# Patient Record
Sex: MALE | Race: WHITE | NOT HISPANIC OR LATINO | Employment: OTHER | URBAN - METROPOLITAN AREA
[De-identification: names, ages, dates, MRNs, and addresses within clinical notes are randomized per-mention and may not be internally consistent; named-entity substitution may affect disease eponyms.]

---

## 2024-05-27 ENCOUNTER — HOSPITAL ENCOUNTER (EMERGENCY)
Facility: CLINIC | Age: 64
Discharge: HOME | End: 2024-05-27
Attending: PHYSICIAN ASSISTANT | Admitting: PHYSICIAN ASSISTANT
Payer: COMMERCIAL

## 2024-05-27 VITALS
SYSTOLIC BLOOD PRESSURE: 118 MMHG | TEMPERATURE: 97.2 F | HEART RATE: 94 BPM | RESPIRATION RATE: 20 BRPM | DIASTOLIC BLOOD PRESSURE: 73 MMHG | OXYGEN SATURATION: 98 %

## 2024-05-27 DIAGNOSIS — M25.561 RIGHT KNEE PAIN: ICD-10-CM

## 2024-05-27 PROCEDURE — 99203 OFFICE O/P NEW LOW 30 MIN: CPT | Performed by: PHYSICIAN ASSISTANT

## 2024-05-27 PROCEDURE — G0463 HOSPITAL OUTPT CLINIC VISIT: HCPCS

## 2024-05-27 RX ORDER — LOSARTAN POTASSIUM AND HYDROCHLOROTHIAZIDE 12.5; 5 MG/1; MG/1
1 TABLET ORAL DAILY
COMMUNITY
Start: 2024-01-29

## 2024-05-27 RX ORDER — ATORVASTATIN CALCIUM 40 MG/1
40 TABLET, FILM COATED ORAL AT BEDTIME
Status: ON HOLD | COMMUNITY
Start: 2023-08-07 | End: 2024-07-30

## 2024-05-27 RX ORDER — AMLODIPINE BESYLATE 10 MG/1
1 TABLET ORAL EVERY EVENING
COMMUNITY
Start: 2024-01-29

## 2024-05-27 RX ORDER — SPIRONOLACTONE 25 MG/1
0.5 TABLET ORAL EVERY EVENING
COMMUNITY
Start: 2024-01-29

## 2024-05-27 ASSESSMENT — ENCOUNTER SYMPTOMS: CONSTITUTIONAL NEGATIVE: 1

## 2024-05-27 ASSESSMENT — COLUMBIA-SUICIDE SEVERITY RATING SCALE - C-SSRS
2. HAVE YOU ACTUALLY HAD ANY THOUGHTS OF KILLING YOURSELF IN THE PAST MONTH?: NO
6. HAVE YOU EVER DONE ANYTHING, STARTED TO DO ANYTHING, OR PREPARED TO DO ANYTHING TO END YOUR LIFE?: NO
1. IN THE PAST MONTH, HAVE YOU WISHED YOU WERE DEAD OR WISHED YOU COULD GO TO SLEEP AND NOT WAKE UP?: NO

## 2024-05-27 ASSESSMENT — ACTIVITIES OF DAILY LIVING (ADL): ADLS_ACUITY_SCORE: 35

## 2024-05-27 NOTE — ED TRIAGE NOTES
Pt present in wheelchair , states he does not normally use one at home.   [place] pain to the back of right knee.   Pt denies falling   Pt was given KENALOG injection at 5/10/24 visit   Pt had the right knee assessed and Xray 5/10/24 and states it feels worse since this morning.     Took ibuprofen at 1300 per pt

## 2024-05-27 NOTE — ED PROVIDER NOTES
History     Chief Complaint   Patient presents with    Knee Injury     HPI  Misha Castro is a 63 year old male who presents to Urgent Care with complaints of right knee pain.  [place] he started to experience pain in his right knee.  The pain became suddenly worse despite taking small steps.  He has increased pain with attempting to weight-bear now.  Pain is localized to posterior right knee.  He states he has had intermittent pain in this knee and had x-rays obtained on 5/10/2024 of the knee that were reportedly negative.  He received a steroid injection in the knee which helped for about 2 weeks before his pain returned.  Denies associated swelling.  Denies fevers or chills.  Did not fall.      Allergies:  No Known Allergies    Problem List:    Patient Active Problem List    Diagnosis Date Noted    CARDIOVASCULAR SCREENING; LDL GOAL LESS THAN 130 10/31/2010     Priority: Medium    HTN (hypertension) 12/15/2008     Priority: Medium     (Problem list name updated by automated process. Provider to review and confirm.)          Past Medical History:    No past medical history on file.    Past Surgical History:    Past Surgical History:   Procedure Laterality Date    COLONOSCOPY  12/9/2003       Family History:    Family History   Problem Relation Age of Onset    Breast Cancer Maternal Grandmother     Heart Disease Maternal Grandfather        Social History:  Marital Status:   [2]  Social History     Tobacco Use    Smoking status: Never   Substance Use Topics    Alcohol use: Yes     Comment: occ    Drug use: No        Medications:    amLODIPine (NORVASC) 10 MG tablet  atorvastatin (LIPITOR) 40 MG tablet  losartan-hydrochlorothiazide (HYZAAR) 50-12.5 MG tablet  spironolactone (ALDACTONE) 25 MG tablet  CLONIDINE HCL 0.1 MG OR TABS  FIBER THERAPY 0.52 GM OR CAPS  LISINOPRIL 20 MG OR TABS  METOPROLOL TARTRATE 50 MG OR TABS  ZANTAC 150 MG OR  TABS          Review of Systems   Constitutional: Negative.    Musculoskeletal:         Right knee pain   Skin: Negative.    All other systems reviewed and are negative.      Physical Exam   BP: 118/73  Pulse: 94  Temp: 97.2  F (36.2  C)  Resp: 20  SpO2: 98 %      Physical Exam  Constitutional:       General: He is not in acute distress.     Appearance: Normal appearance. He is well-developed. He is not ill-appearing, toxic-appearing or diaphoretic.   HENT:      Head: Normocephalic and atraumatic.   Eyes:      Conjunctiva/sclera: Conjunctivae normal.   Cardiovascular:      Pulses: Normal pulses.   Pulmonary:      Effort: Pulmonary effort is normal.   Musculoskeletal:      Cervical back: Neck supple.      Right knee: Swelling present. No effusion, erythema, ecchymosis or crepitus. Decreased range of motion. Tenderness (posteriorly) present. Normal alignment.      Right lower leg: Normal. No swelling or tenderness. No edema.      Right ankle: Normal. No swelling. No tenderness. Normal range of motion.      Comments: Increased pain with full extension and full flexion   Skin:     General: Skin is warm and dry.      Capillary Refill: Capillary refill takes less than 2 seconds.   Neurological:      General: No focal deficit present.      Mental Status: He is alert.      Sensory: Sensation is intact.      Motor: Motor function is intact.         ED Course        Procedures    No results found for this or any previous visit (from the past 24 hour(s)).    Medications - No data to display    Assessments & Plan (with Medical Decision Making)     Pt is a 63 year old male who presents to Urgent Care with complaints of right knee pain.  Patient [place] he started to experience pain in his right knee.  The pain became suddenly worse despite taking small steps.  He has increased pain with attempting to weight-bear now.  Pain is localized to posterior right knee.  He states he has  had intermittent pain in this knee and had x-rays obtained on 5/10/2024 of the knee that were reportedly negative.  He received a steroid injection in the knee which helped for about 2 weeks before his pain returned.  Denies associated swelling.  Denies fevers or chills.  Did not fall.    Pt is afebrile on arrival.  Exam as above.  Will hold off on x-ray imaging at this time as patient just had x-rays a couple weeks ago that he reports were negative.  No indication for emergent MRI imaging today.  Does not appear consistent with septic joint or infection.  Encouraged symptomatic treatments at home.  Orthopedic referral placed.  Return precautions were reviewed.  Hand-outs were provided.    Patient was instructed to follow-up with orthopedics for continued care and management.  He is to return to the ED for persistent and/or worsening symptoms.  Patient expressed understanding of the diagnosis and plan and was discharged home in good condition.    I have reviewed the nursing notes.    I have reviewed the findings, diagnosis, plan and need for follow up with the patient.      Discharge Medication List as of 5/27/2024  3:17 PM          Final diagnoses:   Right knee pain       5/27/2024   [facility] EMERGENCY DEPT      Disclaimer:  This note consists of symbols derived from keyboarding, dictation and/or voice recognition software.  As a result, there may be errors in the script that have gone undetected.  Please consider this when interpreting information found in this chart.     Kerry Macias PA-C  05/27/24 1935

## 2024-07-17 ENCOUNTER — TRANSFERRED RECORDS (OUTPATIENT)
Dept: HEALTH INFORMATION MANAGEMENT | Facility: CLINIC | Age: 64
End: 2024-07-17
Payer: COMMERCIAL

## 2024-07-18 ENCOUNTER — HOSPITAL ENCOUNTER (OUTPATIENT)
Facility: CLINIC | Age: 64
End: 2024-07-18
Attending: ORTHOPAEDIC SURGERY | Admitting: ORTHOPAEDIC SURGERY
Payer: COMMERCIAL

## 2024-07-18 DIAGNOSIS — S83.249A MEDIAL MENISCUS TEAR: ICD-10-CM

## 2024-07-29 ENCOUNTER — APPOINTMENT (OUTPATIENT)
Dept: CT IMAGING | Facility: CLINIC | Age: 64
End: 2024-07-29
Attending: EMERGENCY MEDICINE
Payer: COMMERCIAL

## 2024-07-29 ENCOUNTER — HOSPITAL ENCOUNTER (OUTPATIENT)
Facility: CLINIC | Age: 64
Setting detail: OBSERVATION
Discharge: HOME | End: 2024-07-30
Attending: EMERGENCY MEDICINE | Admitting: INTERNAL MEDICINE
Payer: COMMERCIAL

## 2024-07-29 DIAGNOSIS — G45.3 AMAUROSIS FUGAX OF RIGHT EYE: ICD-10-CM

## 2024-07-29 DIAGNOSIS — H34.231 RETINAL ARTERY BRANCH OCCLUSION OF RIGHT EYE: Primary | ICD-10-CM

## 2024-07-29 PROBLEM — H81.10 BPPV (BENIGN PAROXYSMAL POSITIONAL VERTIGO): Status: ACTIVE | Noted: 2024-07-29

## 2024-07-29 PROBLEM — K58.9 IRRITABLE BOWEL SYNDROME: Status: ACTIVE | Noted: 2024-07-29

## 2024-07-29 PROBLEM — E78.2 MIXED HYPERLIPIDEMIA: Status: ACTIVE | Noted: 2024-07-29

## 2024-07-29 PROBLEM — N43.3 HYDROCELE: Status: ACTIVE | Noted: 2024-07-29

## 2024-07-29 LAB
ANION GAP SERPL CALCULATED.3IONS-SCNC: 12 MMOL/L (ref 7–15)
APTT PPP: 26 SECONDS (ref 22–38)
BASOPHILS # BLD AUTO: 0.1 10E3/UL (ref 0–0.2)
BASOPHILS NFR BLD AUTO: 1 %
BUN SERPL-MCNC: 22 MG/DL (ref 8–23)
CALCIUM SERPL-MCNC: 8.8 MG/DL (ref 8.8–10.4)
CHLORIDE SERPL-SCNC: 101 MMOL/L (ref 98–107)
CREAT SERPL-MCNC: 1.05 MG/DL (ref 0.67–1.17)
EGFRCR SERPLBLD CKD-EPI 2021: 80 ML/MIN/1.73M2
EOSINOPHIL # BLD AUTO: 0.2 10E3/UL (ref 0–0.7)
EOSINOPHIL NFR BLD AUTO: 2 %
ERYTHROCYTE [DISTWIDTH] IN BLOOD BY AUTOMATED COUNT: 13.2 % (ref 10–15)
GLUCOSE SERPL-MCNC: 113 MG/DL (ref 70–99)
HCO3 SERPL-SCNC: 25 MMOL/L (ref 22–29)
HCT VFR BLD AUTO: 43.9 % (ref 40–53)
HGB BLD-MCNC: 15.7 G/DL (ref 13.3–17.7)
HOLD SPECIMEN: NORMAL
IMM GRANULOCYTES # BLD: 0.1 10E3/UL
IMM GRANULOCYTES NFR BLD: 1 %
INR PPP: 1 (ref 0.85–1.15)
LYMPHOCYTES # BLD AUTO: 2.3 10E3/UL (ref 0.8–5.3)
LYMPHOCYTES NFR BLD AUTO: 23 %
MCH RBC QN AUTO: 30.4 PG (ref 26.5–33)
MCHC RBC AUTO-ENTMCNC: 35.8 G/DL (ref 31.5–36.5)
MCV RBC AUTO: 85 FL (ref 78–100)
MONOCYTES # BLD AUTO: 0.9 10E3/UL (ref 0–1.3)
MONOCYTES NFR BLD AUTO: 9 %
NEUTROPHILS # BLD AUTO: 6.5 10E3/UL (ref 1.6–8.3)
NEUTROPHILS NFR BLD AUTO: 64 %
NRBC # BLD AUTO: 0 10E3/UL
NRBC BLD AUTO-RTO: 0 /100
PLATELET # BLD AUTO: 229 10E3/UL (ref 150–450)
POTASSIUM SERPL-SCNC: 3.6 MMOL/L (ref 3.4–5.3)
RBC # BLD AUTO: 5.16 10E6/UL (ref 4.4–5.9)
SODIUM SERPL-SCNC: 138 MMOL/L (ref 135–145)
TROPONIN T SERPL HS-MCNC: <6 NG/L
WBC # BLD AUTO: 10.1 10E3/UL (ref 4–11)

## 2024-07-29 PROCEDURE — 85730 THROMBOPLASTIN TIME PARTIAL: CPT | Performed by: EMERGENCY MEDICINE

## 2024-07-29 PROCEDURE — 70496 CT ANGIOGRAPHY HEAD: CPT

## 2024-07-29 PROCEDURE — 70450 CT HEAD/BRAIN W/O DYE: CPT | Mod: XU

## 2024-07-29 PROCEDURE — G0378 HOSPITAL OBSERVATION PER HR: HCPCS

## 2024-07-29 PROCEDURE — 99285 EMERGENCY DEPT VISIT HI MDM: CPT | Mod: 25 | Performed by: EMERGENCY MEDICINE

## 2024-07-29 PROCEDURE — 99285 EMERGENCY DEPT VISIT HI MDM: CPT | Mod: 25

## 2024-07-29 PROCEDURE — 80048 BASIC METABOLIC PNL TOTAL CA: CPT | Performed by: EMERGENCY MEDICINE

## 2024-07-29 PROCEDURE — 250N000009 HC RX 250: Performed by: EMERGENCY MEDICINE

## 2024-07-29 PROCEDURE — 85025 COMPLETE CBC W/AUTO DIFF WBC: CPT | Performed by: EMERGENCY MEDICINE

## 2024-07-29 PROCEDURE — 80061 LIPID PANEL: CPT

## 2024-07-29 PROCEDURE — 85610 PROTHROMBIN TIME: CPT | Performed by: EMERGENCY MEDICINE

## 2024-07-29 PROCEDURE — 99222 1ST HOSP IP/OBS MODERATE 55: CPT

## 2024-07-29 PROCEDURE — 250N000011 HC RX IP 250 OP 636: Performed by: EMERGENCY MEDICINE

## 2024-07-29 PROCEDURE — 93010 ELECTROCARDIOGRAM REPORT: CPT | Performed by: EMERGENCY MEDICINE

## 2024-07-29 PROCEDURE — 36415 COLL VENOUS BLD VENIPUNCTURE: CPT | Performed by: EMERGENCY MEDICINE

## 2024-07-29 PROCEDURE — 84484 ASSAY OF TROPONIN QUANT: CPT | Performed by: EMERGENCY MEDICINE

## 2024-07-29 PROCEDURE — 83036 HEMOGLOBIN GLYCOSYLATED A1C: CPT

## 2024-07-29 PROCEDURE — 250N000013 HC RX MED GY IP 250 OP 250 PS 637: Performed by: EMERGENCY MEDICINE

## 2024-07-29 PROCEDURE — 93005 ELECTROCARDIOGRAM TRACING: CPT

## 2024-07-29 RX ORDER — CLOPIDOGREL BISULFATE 75 MG/1
300 TABLET ORAL ONCE
Status: COMPLETED | OUTPATIENT
Start: 2024-07-29 | End: 2024-07-29

## 2024-07-29 RX ORDER — ASPIRIN 81 MG/1
81 TABLET, CHEWABLE ORAL EVERY EVENING
Status: ON HOLD | COMMUNITY
End: 2024-07-30

## 2024-07-29 RX ORDER — CALCIUM POLYCARBOPHIL 625 MG
1 TABLET ORAL EVERY EVENING
COMMUNITY

## 2024-07-29 RX ORDER — IOPAMIDOL 755 MG/ML
67 INJECTION, SOLUTION INTRAVASCULAR ONCE
Status: COMPLETED | OUTPATIENT
Start: 2024-07-29 | End: 2024-07-29

## 2024-07-29 RX ORDER — FAMOTIDINE 10 MG
10 TABLET ORAL DAILY PRN
COMMUNITY

## 2024-07-29 RX ORDER — CHOLECALCIFEROL (VITAMIN D3) 50 MCG
1 TABLET ORAL EVERY EVENING
COMMUNITY

## 2024-07-29 RX ORDER — ASPIRIN 325 MG
325 TABLET ORAL ONCE
Status: COMPLETED | OUTPATIENT
Start: 2024-07-29 | End: 2024-07-29

## 2024-07-29 RX ADMIN — IOPAMIDOL 67 ML: 755 INJECTION, SOLUTION INTRAVENOUS at 20:36

## 2024-07-29 RX ADMIN — CLOPIDOGREL BISULFATE 300 MG: 75 TABLET ORAL at 21:34

## 2024-07-29 RX ADMIN — SODIUM CHLORIDE 100 ML: 9 INJECTION, SOLUTION INTRAVENOUS at 20:36

## 2024-07-29 RX ADMIN — ASPIRIN 325 MG ORAL TABLET 325 MG: 325 PILL ORAL at 21:34

## 2024-07-29 ASSESSMENT — ACTIVITIES OF DAILY LIVING (ADL)
CONCENTRATING,_REMEMBERING_OR_MAKING_DECISIONS_DIFFICULTY: NO
ADLS_ACUITY_SCORE: 35
DIFFICULTY_EATING/SWALLOWING: NO
WALKING_OR_CLIMBING_STAIRS_DIFFICULTY: NO
HEARING_DIFFICULTY_OR_DEAF: NO
ADLS_ACUITY_SCORE: 20
DRESSING/BATHING_DIFFICULTY: NO
WEAR_GLASSES_OR_BLIND: YES
ADLS_ACUITY_SCORE: 35
FALL_HISTORY_WITHIN_LAST_SIX_MONTHS: NO
DIFFICULTY_COMMUNICATING: NO
CHANGE_IN_FUNCTIONAL_STATUS_SINCE_ONSET_OF_CURRENT_ILLNESS/INJURY: NO
ADLS_ACUITY_SCORE: 35
DOING_ERRANDS_INDEPENDENTLY_DIFFICULTY: NO
TOILETING_ISSUES: NO

## 2024-07-29 ASSESSMENT — COLUMBIA-SUICIDE SEVERITY RATING SCALE - C-SSRS
2. HAVE YOU ACTUALLY HAD ANY THOUGHTS OF KILLING YOURSELF IN THE PAST MONTH?: NO
1. IN THE PAST MONTH, HAVE YOU WISHED YOU WERE DEAD OR WISHED YOU COULD GO TO SLEEP AND NOT WAKE UP?: NO
6. HAVE YOU EVER DONE ANYTHING, STARTED TO DO ANYTHING, OR PREPARED TO DO ANYTHING TO END YOUR LIFE?: NO

## 2024-07-29 NOTE — ED TRIAGE NOTES
Pt had an episode around 4pm today where he lost peripheral vision to the right. It lasted for about 45 min. [facility]. Was cleared from there but sent in to get evaluated. No headache. Symptoms have completely resolved     Triage Assessment (Adult)       Row Name 07/29/24 0385          Triage Assessment    Airway WDL WDL        Respiratory WDL    Respiratory WDL WDL        Skin Circulation/Temperature WDL    Skin Circulation/Temperature WDL WDL        Cardiac WDL    Cardiac WDL WDL        Peripheral/Neurovascular WDL    Peripheral Neurovascular WDL WDL        Cognitive/Neuro/Behavioral WDL    Cognitive/Neuro/Behavioral WDL WDL

## 2024-07-30 ENCOUNTER — ORDERS ONLY (AUTO-RELEASED) (OUTPATIENT)
Dept: MEDSURG UNIT | Facility: CLINIC | Age: 64
End: 2024-07-30
Payer: COMMERCIAL

## 2024-07-30 ENCOUNTER — APPOINTMENT (OUTPATIENT)
Dept: MRI IMAGING | Facility: CLINIC | Age: 64
End: 2024-07-30
Attending: NURSE PRACTITIONER
Payer: COMMERCIAL

## 2024-07-30 ENCOUNTER — APPOINTMENT (OUTPATIENT)
Dept: SPEECH THERAPY | Facility: CLINIC | Age: 64
End: 2024-07-30
Payer: COMMERCIAL

## 2024-07-30 ENCOUNTER — APPOINTMENT (OUTPATIENT)
Dept: OCCUPATIONAL THERAPY | Facility: CLINIC | Age: 64
End: 2024-07-30
Payer: COMMERCIAL

## 2024-07-30 ENCOUNTER — APPOINTMENT (OUTPATIENT)
Dept: CARDIOLOGY | Facility: CLINIC | Age: 64
End: 2024-07-30
Payer: COMMERCIAL

## 2024-07-30 VITALS
TEMPERATURE: 97.9 F | SYSTOLIC BLOOD PRESSURE: 158 MMHG | DIASTOLIC BLOOD PRESSURE: 95 MMHG | OXYGEN SATURATION: 95 % | HEIGHT: 67 IN | RESPIRATION RATE: 18 BRPM | HEART RATE: 97 BPM

## 2024-07-30 DIAGNOSIS — G45.3 AMAUROSIS FUGAX OF RIGHT EYE: ICD-10-CM

## 2024-07-30 LAB
ANION GAP SERPL CALCULATED.3IONS-SCNC: 13 MMOL/L (ref 7–15)
BUN SERPL-MCNC: 18.4 MG/DL (ref 8–23)
CALCIUM SERPL-MCNC: 8.5 MG/DL (ref 8.8–10.4)
CHLORIDE SERPL-SCNC: 102 MMOL/L (ref 98–107)
CHOLEST SERPL-MCNC: 144 MG/DL
CREAT SERPL-MCNC: 1.04 MG/DL (ref 0.67–1.17)
EGFRCR SERPLBLD CKD-EPI 2021: 81 ML/MIN/1.73M2
ERYTHROCYTE [DISTWIDTH] IN BLOOD BY AUTOMATED COUNT: 13.2 % (ref 10–15)
GLUCOSE BLDC GLUCOMTR-MCNC: 107 MG/DL (ref 70–99)
GLUCOSE BLDC GLUCOMTR-MCNC: 117 MG/DL (ref 70–99)
GLUCOSE BLDC GLUCOMTR-MCNC: 157 MG/DL (ref 70–99)
GLUCOSE SERPL-MCNC: 97 MG/DL (ref 70–99)
HBA1C MFR BLD: 5.7 %
HCO3 SERPL-SCNC: 25 MMOL/L (ref 22–29)
HCT VFR BLD AUTO: 43.7 % (ref 40–53)
HDLC SERPL-MCNC: 46 MG/DL
HGB BLD-MCNC: 15.2 G/DL (ref 13.3–17.7)
LDLC SERPL CALC-MCNC: 72 MG/DL
LVEF ECHO: NORMAL
MCH RBC QN AUTO: 29.9 PG (ref 26.5–33)
MCHC RBC AUTO-ENTMCNC: 34.8 G/DL (ref 31.5–36.5)
MCV RBC AUTO: 86 FL (ref 78–100)
NONHDLC SERPL-MCNC: 98 MG/DL
PLATELET # BLD AUTO: 218 10E3/UL (ref 150–450)
POTASSIUM SERPL-SCNC: 3.6 MMOL/L (ref 3.4–5.3)
RBC # BLD AUTO: 5.08 10E6/UL (ref 4.4–5.9)
SODIUM SERPL-SCNC: 140 MMOL/L (ref 135–145)
TRIGL SERPL-MCNC: 129 MG/DL
WBC # BLD AUTO: 9.3 10E3/UL (ref 4–11)

## 2024-07-30 PROCEDURE — 999N000147 HC STATISTIC PT IP EVAL DEFER

## 2024-07-30 PROCEDURE — 99239 HOSP IP/OBS DSCHRG MGMT >30: CPT | Performed by: STUDENT IN AN ORGANIZED HEALTH CARE EDUCATION/TRAINING PROGRAM

## 2024-07-30 PROCEDURE — 93306 TTE W/DOPPLER COMPLETE: CPT

## 2024-07-30 PROCEDURE — G0427 INPT/ED TELECONSULT70: HCPCS | Mod: G0 | Performed by: NURSE PRACTITIONER

## 2024-07-30 PROCEDURE — 70553 MRI BRAIN STEM W/O & W/DYE: CPT

## 2024-07-30 PROCEDURE — 250N000013 HC RX MED GY IP 250 OP 250 PS 637

## 2024-07-30 PROCEDURE — G0378 HOSPITAL OBSERVATION PER HR: HCPCS

## 2024-07-30 PROCEDURE — 255N000002 HC RX 255 OP 636: Performed by: STUDENT IN AN ORGANIZED HEALTH CARE EDUCATION/TRAINING PROGRAM

## 2024-07-30 PROCEDURE — 80048 BASIC METABOLIC PNL TOTAL CA: CPT

## 2024-07-30 PROCEDURE — 85027 COMPLETE CBC AUTOMATED: CPT

## 2024-07-30 PROCEDURE — 36415 COLL VENOUS BLD VENIPUNCTURE: CPT

## 2024-07-30 PROCEDURE — 82962 GLUCOSE BLOOD TEST: CPT

## 2024-07-30 PROCEDURE — 93306 TTE W/DOPPLER COMPLETE: CPT | Mod: 26 | Performed by: INTERNAL MEDICINE

## 2024-07-30 PROCEDURE — A9585 GADOBUTROL INJECTION: HCPCS | Performed by: STUDENT IN AN ORGANIZED HEALTH CARE EDUCATION/TRAINING PROGRAM

## 2024-07-30 PROCEDURE — 999N000226 HC STATISTIC SLP IP EVAL DEFER: Performed by: SPEECH-LANGUAGE PATHOLOGIST

## 2024-07-30 PROCEDURE — 97165 OT EVAL LOW COMPLEX 30 MIN: CPT | Mod: GO

## 2024-07-30 RX ORDER — ASPIRIN 325 MG
325 TABLET, DELAYED RELEASE (ENTERIC COATED) ORAL DAILY
Qty: 90 TABLET | Refills: 0 | Status: SHIPPED | OUTPATIENT
Start: 2024-08-21

## 2024-07-30 RX ORDER — ACETAMINOPHEN 650 MG/1
650 SUPPOSITORY RECTAL EVERY 4 HOURS PRN
Status: DISCONTINUED | OUTPATIENT
Start: 2024-07-30 | End: 2024-07-30 | Stop reason: HOSPADM

## 2024-07-30 RX ORDER — ASPIRIN 81 MG/1
81 TABLET, CHEWABLE ORAL EVERY EVENING
Qty: 20 TABLET | Refills: 0 | Status: SHIPPED | OUTPATIENT
Start: 2024-07-30 | End: 2024-08-19

## 2024-07-30 RX ORDER — HYDROCHLOROTHIAZIDE 12.5 MG/1
12.5 TABLET ORAL DAILY
Status: DISCONTINUED | OUTPATIENT
Start: 2024-07-30 | End: 2024-07-30 | Stop reason: HOSPADM

## 2024-07-30 RX ORDER — ATORVASTATIN CALCIUM 20 MG/1
40 TABLET, FILM COATED ORAL AT BEDTIME
Status: DISCONTINUED | OUTPATIENT
Start: 2024-07-30 | End: 2024-07-30

## 2024-07-30 RX ORDER — ASPIRIN 81 MG/1
81 TABLET, CHEWABLE ORAL EVERY EVENING
Status: DISCONTINUED | OUTPATIENT
Start: 2024-07-30 | End: 2024-07-30 | Stop reason: HOSPADM

## 2024-07-30 RX ORDER — CLOPIDOGREL BISULFATE 75 MG/1
75 TABLET ORAL DAILY
Status: DISCONTINUED | OUTPATIENT
Start: 2024-07-30 | End: 2024-07-30 | Stop reason: HOSPADM

## 2024-07-30 RX ORDER — LOSARTAN POTASSIUM AND HYDROCHLOROTHIAZIDE 12.5; 5 MG/1; MG/1
1 TABLET ORAL DAILY
Status: DISCONTINUED | OUTPATIENT
Start: 2024-07-30 | End: 2024-07-30

## 2024-07-30 RX ORDER — ACETAMINOPHEN 650 MG/20.3ML
650 LIQUID ORAL EVERY 4 HOURS PRN
Status: DISCONTINUED | OUTPATIENT
Start: 2024-07-30 | End: 2024-07-30 | Stop reason: HOSPADM

## 2024-07-30 RX ORDER — GADOBUTROL 604.72 MG/ML
9 INJECTION INTRAVENOUS ONCE
Status: COMPLETED | OUTPATIENT
Start: 2024-07-30 | End: 2024-07-30

## 2024-07-30 RX ORDER — LIDOCAINE 40 MG/G
CREAM TOPICAL
Status: DISCONTINUED | OUTPATIENT
Start: 2024-07-30 | End: 2024-07-30 | Stop reason: HOSPADM

## 2024-07-30 RX ORDER — ACETAMINOPHEN 325 MG/1
650 TABLET ORAL EVERY 4 HOURS PRN
Status: DISCONTINUED | OUTPATIENT
Start: 2024-07-30 | End: 2024-07-30 | Stop reason: HOSPADM

## 2024-07-30 RX ORDER — LOSARTAN POTASSIUM 50 MG/1
50 TABLET ORAL DAILY
Status: DISCONTINUED | OUTPATIENT
Start: 2024-07-30 | End: 2024-07-30 | Stop reason: HOSPADM

## 2024-07-30 RX ORDER — AMLODIPINE BESYLATE 10 MG/1
10 TABLET ORAL EVERY EVENING
Status: DISCONTINUED | OUTPATIENT
Start: 2024-07-30 | End: 2024-07-30 | Stop reason: HOSPADM

## 2024-07-30 RX ORDER — ONDANSETRON 4 MG/1
4 TABLET, ORALLY DISINTEGRATING ORAL EVERY 6 HOURS PRN
Status: DISCONTINUED | OUTPATIENT
Start: 2024-07-30 | End: 2024-07-30 | Stop reason: HOSPADM

## 2024-07-30 RX ORDER — ATORVASTATIN CALCIUM 80 MG/1
80 TABLET, FILM COATED ORAL AT BEDTIME
Qty: 30 TABLET | Refills: 0 | Status: SHIPPED | OUTPATIENT
Start: 2024-07-30

## 2024-07-30 RX ORDER — ATORVASTATIN CALCIUM 40 MG/1
80 TABLET, FILM COATED ORAL AT BEDTIME
Qty: 30 TABLET | Refills: 0 | Status: SHIPPED | OUTPATIENT
Start: 2024-07-30 | End: 2024-07-30

## 2024-07-30 RX ORDER — FAMOTIDINE 10 MG
10 TABLET ORAL DAILY PRN
Status: DISCONTINUED | OUTPATIENT
Start: 2024-07-30 | End: 2024-07-30 | Stop reason: HOSPADM

## 2024-07-30 RX ORDER — CLOPIDOGREL BISULFATE 75 MG/1
75 TABLET ORAL DAILY
Qty: 20 TABLET | Refills: 0 | Status: SHIPPED | OUTPATIENT
Start: 2024-07-31 | End: 2024-08-20

## 2024-07-30 RX ORDER — ATORVASTATIN CALCIUM 80 MG/1
80 TABLET, FILM COATED ORAL AT BEDTIME
Status: DISCONTINUED | OUTPATIENT
Start: 2024-07-30 | End: 2024-07-30 | Stop reason: HOSPADM

## 2024-07-30 RX ORDER — SPIRONOLACTONE 25 MG
12.5 TABLET ORAL EVERY EVENING
Status: DISCONTINUED | OUTPATIENT
Start: 2024-07-30 | End: 2024-07-30 | Stop reason: HOSPADM

## 2024-07-30 RX ORDER — ONDANSETRON 2 MG/ML
4 INJECTION INTRAMUSCULAR; INTRAVENOUS EVERY 6 HOURS PRN
Status: DISCONTINUED | OUTPATIENT
Start: 2024-07-30 | End: 2024-07-30 | Stop reason: HOSPADM

## 2024-07-30 RX ADMIN — LOSARTAN POTASSIUM 50 MG: 50 TABLET, FILM COATED ORAL at 08:26

## 2024-07-30 RX ADMIN — CLOPIDOGREL BISULFATE 75 MG: 75 TABLET ORAL at 08:26

## 2024-07-30 RX ADMIN — GADOBUTROL 9 ML: 604.72 INJECTION INTRAVENOUS at 09:29

## 2024-07-30 RX ADMIN — HYDROCHLOROTHIAZIDE 12.5 MG: 12.5 TABLET ORAL at 08:26

## 2024-07-30 ASSESSMENT — ACTIVITIES OF DAILY LIVING (ADL)
ADLS_ACUITY_SCORE: 20
ADLS_ACUITY_SCORE: 21
ADLS_ACUITY_SCORE: 21
ADLS_ACUITY_SCORE: 20
ADLS_ACUITY_SCORE: 20
ADLS_ACUITY_SCORE: 21
PREVIOUS_RESPONSIBILITIES: MEAL PREP;HOUSEKEEPING;YARDWORK;MEDICATION MANAGEMENT;FINANCES;DRIVING;WORK
ADLS_ACUITY_SCORE: 21
ADLS_ACUITY_SCORE: 20
ADLS_ACUITY_SCORE: 21

## 2024-07-30 NOTE — ED PROVIDER NOTES
History     Chief Complaint   Patient presents with    Loss of Vision     HPI  Misha Castro is a 64 year old male who presents for transient right eye partial visual loss.  Symptoms started about 2 hours prior to his arrival, lasted for about 30 minutes before resolving.  He said the superior lateral portion of his right eye was gone.  It was preceded by some wavy lines initially.  No headache.  No nausea, vomiting, double vision, changes in his speech, or difficulty swallowing.  He initially went to the ophthalmologist who did a thorough eye examination and said he was worried about possible stroke versus TIA versus migraine and sent him here for further evaluation.  The patient says his vision is normal now.    Allergies:  No Known Allergies    Problem List:    Patient Active Problem List    Diagnosis Date Noted    Amaurosis fugax of right eye 07/29/2024     Priority: Medium    BPPV (benign paroxysmal positional vertigo) 07/29/2024     Priority: Medium    Hydrocele 07/29/2024     Priority: Medium    Irritable bowel syndrome 07/29/2024     Priority: Medium    Mixed hyperlipidemia 07/29/2024     Priority: Medium    CARDIOVASCULAR SCREENING; LDL GOAL LESS THAN 130 10/31/2010     Priority: Medium    HTN (hypertension) 12/15/2008     Priority: Medium     (Problem list name updated by automated process. Provider to review and confirm.)          Past Medical History:    No past medical history on file.    Past Surgical History:    Past Surgical History:   Procedure Laterality Date    COLONOSCOPY  12/9/2003       Family History:    Family History   Problem Relation Age of Onset    Breast Cancer Maternal Grandmother     Heart Disease Maternal Grandfather        Social History:  Marital Status:   [2]  Social History     Tobacco Use    Smoking status: Never   Substance Use Topics    Alcohol use: Yes     Comment: occ    Drug use: No        Medications:    No current outpatient medications on file.        Review of  "Systems    Physical Exam   BP: (!) 168/109  Pulse: 99  Temp: 98.1  F (36.7  C)  Resp: 18  Height: 170.2 cm (5' 7\")  SpO2: 96 %      Physical Exam  Constitutional:       General: He is not in acute distress.     Appearance: He is well-developed.   HENT:      Head: Normocephalic and atraumatic.   Cardiovascular:      Rate and Rhythm: Normal rate.   Pulmonary:      Effort: No respiratory distress.      Breath sounds: No stridor.   Skin:     General: Skin is warm and dry.   Neurological:      Mental Status: He is alert.      GCS: GCS eye subscore is 4. GCS verbal subscore is 5. GCS motor subscore is 6.      Cranial Nerves: Cranial nerves 2-12 are intact.      Motor: No abnormal muscle tone or pronator drift.      Coordination: Finger-Nose-Finger Test normal. Rapid alternating movements normal.      Gait: Gait normal.         ED Course        Procedures              EKG Interpretation:      Interpreted by Dave Walton MD  Time reviewed: 2002  Symptoms at time of EKG: Transient visual loss   Rhythm: normal sinus   Rate: normal  Axis: normal  Ectopy: none  Conduction: normal  ST Segments/ T Waves: No ST-T wave changes  Q Waves: none  Comparison to prior: Unchanged    Clinical Impression: normal EKG      Critical Care time:  none               Results for orders placed or performed during the hospital encounter of 07/29/24 (from the past 24 hour(s))   CBC with Platelets & Differential    Narrative    The following orders were created for panel order CBC with Platelets & Differential.  Procedure                               Abnormality         Status                     ---------                               -----------         ------                     CBC with platelets and d...[[id number]]                      Final result                 Please view results for these tests on the individual orders.   Basic metabolic panel   Result Value Ref Range    Sodium 138 135 - 145 mmol/L    Potassium 3.6 3.4 - 5.3 mmol/L "    Chloride 101 98 - 107 mmol/L    Carbon Dioxide (CO2) 25 22 - 29 mmol/L    Anion Gap 12 7 - 15 mmol/L    Urea Nitrogen 22.0 8.0 - 23.0 mg/dL    Creatinine 1.05 0.67 - 1.17 mg/dL    GFR Estimate 80 >60 mL/min/1.73m2    Calcium 8.8 8.8 - 10.4 mg/dL    Glucose 113 (H) 70 - 99 mg/dL   INR   Result Value Ref Range    INR 1.00 0.85 - 1.15   Partial thromboplastin time   Result Value Ref Range    aPTT 26 22 - 38 Seconds   Troponin T, High Sensitivity   Result Value Ref Range    Troponin T, High Sensitivity <6 <=22 ng/L   CBC with platelets and differential   Result Value Ref Range    WBC Count 10.1 4.0 - 11.0 10e3/uL    RBC Count 5.16 4.40 - 5.90 10e6/uL    Hemoglobin 15.7 13.3 - 17.7 g/dL    Hematocrit 43.9 40.0 - 53.0 %    MCV 85 78 - 100 fL    MCH 30.4 26.5 - 33.0 pg    MCHC 35.8 31.5 - 36.5 g/dL    RDW 13.2 10.0 - 15.0 %    Platelet Count 229 150 - 450 10e3/uL    % Neutrophils 64 %    % Lymphocytes 23 %    % Monocytes 9 %    % Eosinophils 2 %    % Basophils 1 %    % Immature Granulocytes 1 %    NRBCs per 100 WBC 0 <1 /100    Absolute Neutrophils 6.5 1.6 - 8.3 10e3/uL    Absolute Lymphocytes 2.3 0.8 - 5.3 10e3/uL    Absolute Monocytes 0.9 0.0 - 1.3 10e3/uL    Absolute Eosinophils 0.2 0.0 - 0.7 10e3/uL    Absolute Basophils 0.1 0.0 - 0.2 10e3/uL    Absolute Immature Granulocytes 0.1 <=0.4 10e3/uL    Absolute NRBCs 0.0 10e3/uL   Hye Draw    Narrative    The following orders were created for panel order Hye Draw.  Procedure                               Abnormality         Status                     ---------                               -----------         ------                     Extra Blue Top Tube[[id number]]                              Final result               Extra Green Top (Lithium...[[id number]]                      Final result               Extra Purple Top Tube[[id number]]                            Final result                 Please view results for these tests on the individual orders.   Extra  Blue Top Tube   Result Value Ref Range    Hold Specimen JIC    Extra Green Top (Lithium Heparin) Tube   Result Value Ref Range    Hold Specimen     Extra Purple Top Tube   Result Value Ref Range    Hold Specimen JIC    CT Head w/o Contrast    Narrative    EXAM: CT HEAD W/O CONTRAST  LOCATION: [facility]  DATE: 7/29/2024    INDICATION: Transient left eye visual loss.  COMPARISON: None.  TECHNIQUE: Routine CT Head without IV contrast. Multiplanar reformats. Dose reduction techniques were used.    FINDINGS:  No evidence of hemorrhage, mass, or hydrocephalus. Possible left thalamic lacunar infarct, age indeterminate. Background of volume loss and nonspecific white matter hypoattenuation presumably representing chronic small vessel ischemic change. No acute   osseous abnormality.      Impression    IMPRESSION:  1.  No evidence of hemorrhage.  2.  Possible left thalamic lacunar infarct, age indeterminate.   CTA Head Neck with Contrast    Narrative    EXAM: CTA HEAD NECK W CONTRAST  LOCATION: [facility]  DATE: 7/29/2024    INDICATION: transient left eye visual loss  COMPARISON: None.  CONTRAST: 67 mL Isovue 370  TECHNIQUE: Head and neck CT angiogram with IV contrast. Axial helical CT images of the head and neck vessels obtained during the arterial phase of intravenous contrast administration. Axial 2D reconstructed images and multiplanar 3D MIP reconstructed   images of the head and neck vessels were performed by the technologist. Dose reduction techniques were used. All stenosis measurements made according to NASCET criteria unless otherwise specified.    FINDINGS:   HEAD CTA:  ANTERIOR CIRCULATION: Short segment moderate to marked narrowing proximal left M2 which may involve very distal aspect of left M1. Standard [place] anatomy.    POSTERIOR CIRCULATION: No stenosis/occlusion, aneurysm, or high flow vascular malformation. Balanced vertebral arteries  supply a normal basilar artery.     DURAL VENOUS SINUSES: Expected enhancement of the major dural venous sinuses.    NECK CTA:  RIGHT CAROTID: No measurable stenosis or dissection.    LEFT CAROTID: No measurable stenosis or dissection.    VERTEBRAL ARTERIES: No focal stenosis or dissection. Balanced vertebral arteries.    AORTIC ARCH: Classic aortic arch anatomy with no significant stenosis at the origin of the great vessels.    NONVASCULAR STRUCTURES: Moderate canal narrowing C5-6.      Impression    IMPRESSION:     HEAD CTA:   1.  Short segment moderate to marked narrowing proximal left M2 which may involve very distal aspect of left M1.  2.  Intracranial circulation appears otherwise normal.    NECK CTA:  1.  No hemodynamically significant narrowing throughout major neck vessels.       Medications   iopamidol (ISOVUE-370) solution 67 mL (67 mLs Intravenous $Given 7/29/24 2036)   sodium chloride 0.9 % bag 500mL for CT scan flush use (100 mLs Intravenous $Given 7/29/24 2036)   aspirin (ASA) tablet 325 mg (325 mg Oral $Given 7/29/24 2134)   clopidogrel (PLAVIX) tablet 300 mg (300 mg Oral $Given 7/29/24 2134)       Assessments & Plan (with Medical Decision Making)   64-year-old male presents for evaluation of transient visual loss from the right eye, now resolved.  Normal neurologic examination now.  EKG is sinus rhythm without signs of ischemia or dysrhythmia.  Given the patient's transient visual loss I am concerned for possible neurologic event and therefore a TIA workup was pursued.  I have discussed the case with the on-call stroke neurologist who agrees with the above plan, we discussed ongoing management of the patient and he recommends admission with MRI and stroke workup.  He also recommended loading the patient with aspirin and clopidogrel if there is no hemorrhage on the CT scan.  CT of the head and CT angio of the head and neck obtained, images interpreted independently as well as radiology read reviewed,  no signs of intracranial hemorrhage.  There is a small lacunar infarct.  There is also a narrowing of the proximal left M2.  I discussed the results again with the on-call stroke physician and he said that nothing changes based on the above findings.    I have reviewed the blood test as above and they are all reassuring.  Given the patient's symptoms and the lacunar infarct seen on the CT scan the patient will be admitted to the hospital service for further care.  Plan for MRI in the morning and further stroke workup.  I discussed the case with the on-call hospitalist, we discussed ongoing management of the patient and they have agreed to accept the patient to their service.    I have reviewed the nursing notes.    I have reviewed the findings, diagnosis, plan and need for follow up with the patient.           Medical Decision Making  The patient's presentation was of high complexity (an acute health issue posing potential threat to life or bodily function).    The patient's evaluation involved:  ordering and/or review of 3+ test(s) in this encounter (see separate area of note for details)  independent interpretation of testing performed by another health professional (see separate area of note for details)  discussion of management or test interpretation with another health professional (see separate area of note for details)    The patient's management necessitated high risk (a decision regarding hospitalization).        Current Discharge Medication List          Final diagnoses:   Amaurosis fugax of right eye       7/29/2024   [facility] EMERGENCY DEPT       aDve Walton MD  07/29/24 8285

## 2024-07-30 NOTE — PHARMACY
Pharmacy Consult to evaluate for medication related stroke core measures    Misha Castro, 64 year old male admitted for transient R-sided vision loss on 7/29/2024.    Thrombolytic was not given because of symptoms quickly resolved    VTE Prophylaxis: SCDs    Antithrombotic: aspirin and clopidogrel started on 7/29, as appropriate by end of hospital day 2. Continue antithrombotic therapy on discharge to meet quality measures, unless contraindicated.    Anticoagulation if history of A-fib/flutter: Patient does not have history of A-fib/flutter - anticoagulation not required for medication related stroke core measures.     LDL Cholesterol Calculated   Date Value Ref Range Status   07/29/2024 72 <=100 mg/dL Final       Patient currently receiving Lipitor (atorvastatin) continue statin on discharge to meet quality measures, unless contraindicated.    Recommendations: None at this time    Thank you for the consult.    Nia Das RPH 7/30/2024 9:15 AM

## 2024-07-30 NOTE — MEDICATION SCRIBE - ADMISSION MEDICATION HISTORY
Medication Scribe Admission Medication History    Admission medication history is complete. The information provided in this note is only as accurate as the sources available at the time of the update.    Information Source(s): Patient, Clinic records, and CareEverywhere/SureScripts via  with patient in room and finished at desk.    Pertinent Information: Med list reviewed with patient with med list from [facility].    Changes made to PTA medication list:  Added: Aspirin 81 mg, Vitamin D 50 mcg, Famotidine 10 mg, Fiber Tablet.  Deleted: Clonidine 0.1 mg, Fiber Capsule, Lisinopril 20 mg, Metoprolol Tartate 50 mg, Ranitidine 150 mg.  Changed: None    Allergies reviewed with patient and updates made in EHR: yes, no allergies.    Medication History Completed By: Tamela Jimenes 7/29/2024 8:15 PM    PTA Med List   Medication Sig Last Dose    amLODIPine (NORVASC) 10 MG tablet Take 1 tablet by mouth every evening 7/28/2024 at pm    aspirin (ASA) 81 MG chewable tablet Take 81 mg by mouth every evening 7/28/2024 at pm    atorvastatin (LIPITOR) 40 MG tablet Take 40 mg by mouth at bedtime 7/28/2024 at hs    Calcium Polycarbophil (FIBER) 625 MG tablet Take 1 tablet by mouth every evening 7/28/2024 at pm    famotidine (PEPCID) 10 MG tablet Take 10 mg by mouth daily as needed (heartburn) Past Week at prn    losartan-hydrochlorothiazide (HYZAAR) 50-12.5 MG tablet Take 1 tablet by mouth daily 7/29/2024 at am    spironolactone (ALDACTONE) 25 MG tablet Take 0.5 tablets by mouth every evening 7/28/2024 at pm    vitamin D3 (CHOLECALCIFEROL) 50 mcg (2000 units) tablet Take 1 tablet by mouth every evening 7/28/2024 at pm

## 2024-07-30 NOTE — PLAN OF CARE
Goal Outcome Evaluation:    Patient reports no further visual changes.  No headache reported.  Neuro assessment intact.  No dizziness when up.  Tele on, showing sinus rhythm.

## 2024-07-30 NOTE — CONSULTS
Care Management Note:     Care Management team received referral due to stroke protocol.      Per IDT rounds, EMR review, and/or evaluations by PT/OT/ST staff, it has been determined that pt is safe to discharge to home with no CM needs.     Care Management will close referral at this time, but please place new consult should pt develop new needs during this stay.    Brandi Montemayor, RNCM  Care Transitions Registered Nurse  Tele: [phone]

## 2024-07-30 NOTE — PROGRESS NOTES
"[facility] ADMISSION NOTE    Patient admitted to room 2304 at approximately 2230 via wheel chair from emergency room. Patient was accompanied by transport tech.     Verbal SBAR report received from Paulo prior to patient arrival.     Patient ambulated to bed independently. Patient alert and oriented X 3. The patient is not having any pain.  . Admission vital signs: Blood pressure (!) 171/96, pulse 95, temperature 97.6  F (36.4  C), resp. rate 18, height 1.702 m (5' 7\"), SpO2 96%. Patient was oriented to plan of care, call light, bed controls, tv, telephone, bathroom, and visiting hours.     Risk Assessment    The following safety risks were identified during admission: none. Yellow risk band applied: NO.     Skin Initial Assessment    This writer admitted this patient and completed a full skin assessment and Will score in the Adult PCS flowsheet.   Photo documentation of skin problem and/or wound competed via [employer] application (located under Media):  No    Appropriate interventions initiated as needed.     Pt declined skin assessment          Education    Patient has a [place] to Observation order: Yes  Observation education completed and documented: Yes      Danielle Mac RN        "
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
## 2024-07-30 NOTE — CONSULTS
"[facility]    Stroke Consult Note    Reason for Consult: Stroke    Chief Complaint: Loss of Vision       HPI  Misha Castro is a 64 year old male with past medical history significant for hypertension, hyperlipidemia, and L eye BRAO (2018) who presented to the [facility] ED 7/29/2024 for evaluation of transient vision changes.  He reports that he was washing his hands and noticed \"squigglies\" off to the right of his vision. When he looked at the TV, he couldn't see the right half of the screen. He closed each eye individually and vision was fine in the left eye. He had a slight headache after it was over.  The visual symptoms lasted about 45 minutes.  He denies associated focal weakness, numbness, language difficulty, or impaired balance.    Initial imaging revealed a moderate to severe left M2 stenosis.  MRI brain was negative for acute infarct but demonstrated a chronic left thalamic lacune. Today on exam, he reports feeling at baseline.  He has not had any recurrence of the visual deficit.  He denies a history of migraine or migraine aura. Home BP readings 130/80-90s.     Stroke Evaluation Summarized    MRI/Head CT No evidence of acute stroke.  Chronic left thalamic infarct.    Intracranial Vasculature CTA head with short segment moderate to marked narrowing of the proximal left M2   Cervical Vasculature CTA neck with no significant stenosis     Echocardiogram Pending   EKG/Telemetry Sinus   Other Testing Not Applicable      LDL  7/29/2024: 72 mg/dL   A1C  7/29/2024: 5.7 %   Troponin 7/29/2024: <6 ng/L       Impression  1. Transient partial vision loss in the right eye.  Given monocular visual deficit, concern for transient BRAO.  Consideration for migraine aura, however, headache not consistent with migraine and he does not have a history of such.    2.  Moderate to severe left M2 stenosis, asymptomatic    3.  History of left eye BRAO     Recommendations   Secondary Stroke " Spoke with mom after speaking to [facility]-will try the Similac Sens powder   "Prevention  - Dual antiplatelet therapy with ASA 81 mg + Plavix 75 mg for 21 days, then ASA 81 mg alone indefinitely   - LDL 72, on atorvastatin 40 mg daily prior to arrival.  Discussed increasing  atorvastatin to 80 mg daily to optimize vascular risk factors. Titrate to goal LDL 40-70  - A1c 5.7, within goal <7.0  - Long term goal BP <130/80 with tighter control associated with decreased overall CV risk, if tolerated. Discussed the importance of home BP monitoring and keeping a log for PCP.  - PT/OT/SLP, as needed  - Stroke education. Discussed stroke warning signs (BE FAST) and need for emergent presentation if symptoms occur  - LISANDRO screen: Reports significant snoring, possible apneic episodes.  Recommend referral to sleep medicine.    Diagnostic Evaluation  - Discharge with 14 day Ziopatch to screen for atrial fibrillation    Patient Follow-up    - in the next 1-2 week(s) with PCP  - in 6-8 weeks with general neurology or stroke JESSICA ([phone])    Thank you for this consult.  We will follow peripherally for results of TTE and then sign off.    Ashlie Glover, CNP  Vascular Neurology    To page me or covering stroke neurology team member, click here: AMCOM  Choose \"On Call\" tab at top, then select \"NEUROLOGY/ALL SITES\" from middle drop-down box, press Enter, then look for \"stroke\" or \"telestroke\" for your site.  _____________________________________________________    Clinically Significant Risk Factors Present on Admission                # Drug Induced Platelet Defect: home medication list includes an antiplatelet medication   # Hypertension: Noted on problem list                    Past Medical History    No past medical history on file.  Medications   Home Meds  Prior to Admission medications    Medication Sig Start Date End Date Taking? Authorizing Provider   amLODIPine (NORVASC) 10 MG tablet Take 1 tablet by mouth every evening 1/29/24  Yes Reported, Patient   aspirin (ASA) 81 MG chewable tablet Take 81 " mg by mouth every evening   Yes Reported, Patient   atorvastatin (LIPITOR) 40 MG tablet Take 40 mg by mouth at bedtime 8/7/23  Yes Reported, Patient   Calcium Polycarbophil (FIBER) 625 MG tablet Take 1 tablet by mouth every evening   Yes Reported, Patient   famotidine (PEPCID) 10 MG tablet Take 10 mg by mouth daily as needed (heartburn)   Yes Reported, Patient   losartan-hydrochlorothiazide (HYZAAR) 50-12.5 MG tablet Take 1 tablet by mouth daily 1/29/24  Yes Reported, Patient   spironolactone (ALDACTONE) 25 MG tablet Take 0.5 tablets by mouth every evening 1/29/24  Yes Reported, Patient   vitamin D3 (CHOLECALCIFEROL) 50 mcg (2000 units) tablet Take 1 tablet by mouth every evening   Yes Reported, Patient       Scheduled Meds  Current Facility-Administered Medications   Medication Dose Route Frequency Provider Last Rate Last Admin    amLODIPine (NORVASC) tablet 10 mg  10 mg Oral QPM Jalyn Ji PA-C        aspirin (ASA) chewable tablet 81 mg  81 mg Oral QPM Jalyn Ji PA-C        atorvastatin (LIPITOR) tablet 40 mg  40 mg Oral At Bedtime Jalyn Ji PA-C        clopidogrel (PLAVIX) tablet 75 mg  75 mg Oral or NG Tube Daily Jalyn Ji PA-C   75 mg at 07/30/24 0826    losartan (COZAAR) tablet 50 mg  50 mg Oral Daily Jalyn Ji PA-C   50 mg at 07/30/24 0826    And    hydroCHLOROthiazide tablet 12.5 mg  12.5 mg Oral Daily Jalyn Ji PA-C   12.5 mg at 07/30/24 0826    sodium chloride (PF) 0.9% PF flush 3 mL  3 mL Intracatheter Q8H Jalyn Ji PA-C   3 mL at 07/30/24 0826    spironolactone (ALDACTONE) half-tab 12.5 mg  12.5 mg Oral QPM Jalyn Ji PA-C           Infusion Meds  Current Facility-Administered Medications   Medication Dose Route Frequency Provider Last Rate Last Admin       Allergies   No Known Allergies       PHYSICAL EXAMINATION   Temp:  [97.6  F (36.4  C)-98.1  F (36.7  C)] 97.9  F (36.6  C)  Pulse:   [] 97  Resp:  [18-20] 18  BP: (144-171)/() 158/95  SpO2:  [95 %-98 %] 95 %    Neuro Exam  Mental Status:  alert, oriented x 3, follows commands, speech clear and fluent, naming and repetition normal  Cranial Nerves:  visual fields intact (tested by nurse), EOMI with normal smooth pursuit, facial sensation intact and symmetric (tested by nurse), facial movements symmetric, hearing not formally tested but intact to conversation, no dysarthria, shoulder shrug equal bilaterally, tongue protrusion midline  Motor:  no abnormal movements, able to move all limbs antigravity spontaneously with no signs of hemiparesis observed, no pronator drift  Reflexes:  unable to test (telestroke)  Sensory:  light touch sensation intact and symmetric throughout upper and lower extremities (assessed by nurse), no extinction on double simultaneous stimulation (assessed by nurse)  Coordination:  normal finger-to-nose and heel-to-shin bilaterally without dysmetria  Station/Gait:  unable to test due to telestroke    Stroke Scales    NIHSS  1a. Level of Consciousness 0-->Alert, keenly responsive   1b. LOC Questions 0-->Answers both questions correctly   1c. LOC Commands 0-->Performs both tasks correctly   2.   Best Gaze 0-->Normal   3.   Visual 0-->No visual loss   4.   Facial Palsy 0-->Normal symmetrical movements   5a. Motor Arm, Left 0-->No drift, limb holds 90 (or 45) degrees for full 10 secs   5b. Motor Arm, Right 0-->No drift, limb holds 90 (or 45) degrees for full 10 secs   6a. Motor Leg, Left 0-->No drift, leg holds 30 degree position for full 5 secs   6b. Motor Leg, right 0-->No drift, leg holds 30 degree position for full 5 secs   7.   Limb Ataxia 0-->Absent   8.   Sensory 0-->Normal, no sensory loss   9.   Best Language 0-->No aphasia, normal   10. Dysarthria 0-->Normal   11. Extinction and Inattention  0-->No abnormality   Total 0 (07/30/24 1305)       Imaging  I personally reviewed all imaging; relevant findings per  "HPI.    Labs Data   CBC  Recent Labs   Lab 07/30/24  0516 07/29/24 2021   WBC 9.3 10.1   RBC 5.08 5.16   HGB 15.2 15.7   HCT 43.7 43.9    229     Basic Metabolic Panel   Recent Labs   Lab 07/30/24  1058 07/30/24  0729 07/30/24  0516 07/30/24  0209 07/29/24 2021   NA  --   --  140  --  138   POTASSIUM  --   --  3.6  --  3.6   CHLORIDE  --   --  102  --  101   CO2  --   --  25  --  25   BUN  --   --  18.4  --  22.0   CR  --   --  1.04  --  1.05   * 107* 97   < > 113*   NERIS  --   --  8.5*  --  8.8    < > = values in this interval not displayed.     Liver Panel  No results for input(s): \"PROTTOTAL\", \"ALBUMIN\", \"BILITOTAL\", \"ALKPHOS\", \"AST\", \"ALT\", \"BILIDIRECT\" in the last 168 hours.  INR    Recent Labs   Lab Test 07/29/24 2021   INR 1.00           Stroke Consult Data Data   Telestroke Service Details  (for non-emergent stroke consult with tele)  Video start time 07/30/24   1300   Video end time 07/30/24   1340   Type of service telemedicine diagnostic assessment of acute neurological changes   Reason telemedicine is appropriate patient requires assessment with a specialist for diagnosis and treatment of neurological symptoms   Mode of transmission secure interactive audio and video communication per Lenny   Originating site (patient location) [facility]    Distant site (provider location) [facility]       I have personally spent a total of 70 minutes providing care today, time spent in reviewing medical records and devising the plan as recorded above.    "

## 2024-07-30 NOTE — PROGRESS NOTES
Speech Language Pathology: Orders received. Chart reviewed and SLP screening completed. Pt reports no speech/language or swallowing deficits.  Speech/language WNL? Speech Language Pathology not indicated due to no identified needs.? ? Will complete orders.        Concepcion Chambers MA, CCC/SLP

## 2024-07-30 NOTE — CONSULTS
"  [facility]    Stroke Telephone Note    I was called by Dave Walton on 07/29/24 regarding patient Misha Castro. The patient is a 64 year old male with transient R sided vision loss lasting 20-30 min at around 4 PM    Vitals      Pulse: 99   Resp: 18   Temp: 98.1  F (36.7  C)        Imaging Findings  CT head: neg for any hemorrhage   CTA head/neck: neg for LVO, L M2 stenosis    Impression  Transient R sided vision loss   L M2 stenosis     Recommendations  - Neurochecks and Vital Signs every 4 hrs  - Load  mg + Plavix 300 mg once followed by Daily aspirin 81 mg and Plavix 75 mg for 21 days followed by ASA 81 mg daily  - Statin: Atorvastatin 40 mg qd  - MRI Brain with and without contrast   - 24-hour Telemetry  - Bedside Glucose Monitoring  - A1c, Lipid Panel  - PT/OT/SLP  - Stroke Education  - Euthermia, Euglycemia  -TTE    My recommendations are based on the information provided over the phone by Misha Castro's in-person providers. They are not intended to replace the clinical judgment of his in-person providers. I was not requested to personally see or examine the patient at this time.     The Stroke Staff is Dr. Solis.    Dez Dunham MD  Vascular Neurology Fellow    To page me or covering stroke neurology team member, click here: AMCOM  Choose \"On Call\" tab at top, then select \"NEUROLOGY/ALL SITES\" from middle drop-down box, press Enter, then look for \"stroke\" or \"telestroke\" for your site.   "

## 2024-07-30 NOTE — PROGRESS NOTES
07/30/24 0800   Appointment Info   Signing Clinician's Name / Credentials (OT) Ryanne Cerda OTR/L   Quick Adds   Quick Adds Certification   Living Environment   People in Home spouse   Current Living Arrangements house   Home Accessibility stairs to enter home;stairs within home   Number of Stairs, Main Entrance 6   Stair Railings, Main Entrance railings safe and in good condition   Number of Stairs, Within Home, Primary six   Stair Railings, Within Home, Primary railings safe and in good condition   Transportation Anticipated family or friend will provide;car, drives self   Living Environment Comments Pt lives in multi level home. No AD used prior to hospitalization   Self-Care   Usual Activity Tolerance good   Current Activity Tolerance moderate   Regular Exercise   (pickle ball)   Equipment Currently Used at Home none   Fall history within last six months no   Activity/Exercise/Self-Care Comment Pt able to perform multiple stairs in clinic and amb halls without AD and no concerns , no SOB or symptoms noted   Instrumental Activities of Daily Living (IADL)   Previous Responsibilities meal prep;housekeeping;yardwork;medication management;finances;driving;work   IADL Comments Pt works part time at [employer] and assists wife with chores at home he states   General Information   Onset of Illness/Injury or Date of Surgery 07/29/24   Referring Physician Jalyn Constantino PA-C   Patient/Family Therapy Goal Statement (OT) to return home   Additional Occupational Profile Info/Pertinent History of Current Problem Misha Castro is a 64 year old male with past medical history of concern for retinal artery branch occlusion s/p hyperbaric oxygen therapy, hydrocele, IBS, HLD, HTN, BPPV now presents on 7/29/2024 with transient right vision loss. He is being admitted for possible TIA & ongoing stroke workup.   Cognitive Status Examination   Orientation Status orientation to person, place and time   Affect/Mental Status  (Cognitive) WNL   Follows Commands WNL   Cognitive Status Comments no major concerns noted during assessment   Visual Perception   Visual Impairment/Limitations WFL;corrective lenses for distance   Impact of Vision Impairment on Function (Vision) no current issues found with vision, resolved yesterday   Pain Assessment   Patient Currently in Pain No  (due to R knee surg next week for meniscus tear)   Posture   Posture not impaired   Range of Motion Comprehensive   General Range of Motion no range of motion deficits identified   Strength Comprehensive (MMT)   General Manual Muscle Testing (MMT) Assessment no strength deficits identified   Coordination   Upper Extremity Coordination No deficits were identified   Bed Mobility   Bed Mobility No deficits identified   Transfers   Transfers No deficits identified   Balance   Balance Assessment no deficits identified   Activities of Daily Living   BADL Assessment/Intervention lower body dressing   Lower Body Dressing Assessment/Training   [place] Level (Lower Body Dressing) lower body dressing skills;doff;don;socks;pants/bottoms;modified independence   Clinical Impression   Criteria for Skilled Therapeutic Interventions Met (OT) Evaluation only;No problems identified which require skilled intervention   OT Diagnosis decreased ability to perform ADLS   Assessment of Occupational Performance 1-3 Performance Deficits   Clinical Decision Making Complexity (OT) problem focused assessment/low complexity   Risk & Benefits of therapy have been explained evaluation/treatment results reviewed   Clinical Impression Comments No skilled OT/PT needs noted at this time per pt and OT with pt moveing well, no concerns w/o AD, able to perform stairs w/o difficulty and no further concerns with vision at this time   OT Total Evaluation Time   OT Eval, Low Complexity Minutes (06160) 15   Therapy Certification   Medical Diagnosis CVA   Start of Care Date 07/30/24   Certification date from  07/30/24   Certification date to 07/30/24   OT Discharge Planning   OT Plan d/c   OT Discharge Recommendation (DC Rec) home   OT Rationale for DC Rec No skilled OT/PT concerns noted at this time . D/C home with wife   OT Brief overview of current status Pt appears at baseline   Total Session Time   Total Session Time (sum of timed and untimed services) 15

## 2024-07-30 NOTE — PROGRESS NOTES
[facility] DISCHARGE NOTE    Patient discharged to home at 3:18 PM via ambulation. Accompanied by spouse and staff. Discharge instructions reviewed with patient, opportunity offered to ask questions. Prescriptions sent to patients preferred pharmacy. All belongings sent with patient.    Lois Canales RN

## 2024-07-30 NOTE — PLAN OF CARE
Physical Therapy: Orders received and acknowledged. Chart reviewed and discussed with OT.? Physical Therapy not indicated due to pt being back to baseline and symptoms have resolved.? Defer discharge recommendations to OT.? Will complete orders.

## 2024-07-31 ENCOUNTER — PATIENT OUTREACH (OUTPATIENT)
Dept: CARE COORDINATION | Facility: CLINIC | Age: 64
End: 2024-07-31
Payer: COMMERCIAL

## 2024-07-31 NOTE — PROGRESS NOTES
[facility]: Transitions of Care Outreach  Chief Complaint   Patient presents with    Clinic Care Coordination - Post Hospital       Most Recent Admission Date: 7/29/2024   Most Recent Admission Diagnosis: Amaurosis fugax of right eye - G45.3     Most Recent Discharge Date: 7/30/2024   Most Recent Discharge Diagnosis: Amaurosis fugax of right eye - G45.3  Retinal artery branch occlusion of right eye - H34.231     Transitions of Care Assessment    Discharge Assessment  How are you doing now that you are home?: I am doing a lot better after getting good sleep. Otherwise I am doing fine.  How are your symptoms? (Red Flag symptoms escalate to triage hotline per guidelines): Improved  Do you know how to contact your clinic care team if you have future questions or changes to your health status? : Yes  Does the patient have their discharge instructions? : Yes  Does the patient have questions regarding their discharge instructions? : No  Were you started on any new medications or were there changes to any of your previous medications? : Yes  Does the patient have all of their medications?: No (see comment) (Pt is going to  medications today.)  Do you have questions regarding any of your medications? : No                  Follow up Plan     Discharge Follow-Up  Discharge follow up appointment scheduled in alignment with recommended follow up timeframe or Transitions of Risk Category? (Low = within 30 days; Moderate= within 14 days; High= within 7 days): No (Pt will call PCP to schedule follow up.)  Patient's follow up appointment not scheduled: Patient declined scheduling support. Education on the importance of transitions of care follow up. Provided scheduling phone number.    No future appointments.    Outpatient Plan as outlined on AVS reviewed with patient.    For any urgent concerns, please contact our 24 hour nurse triage line: [phone] ([phone])       Jenny  [facility]  [phone]  [facility]

## 2024-08-01 NOTE — DISCHARGE SUMMARY
"[facility]  Hospitalist Discharge Summary      Date of Admission:  7/29/2024  Date of Discharge:  7/30/2024  3:22 PM  Discharging Provider: Howard Rocha DO  Discharge Service: Hospitalist Service    Discharge Diagnoses   Branch retinal artery occlusion   Amaurosis fugax of right eye  Hypertension  Benign paroxysmal positional vertigo  Mixed hyperlipidemia   GERD  Hydrocele  Irritable bowel syndrome          Clinically Significant Risk Factors          Follow-ups Needed After Discharge   Follow-up Appointments     Follow-up and recommended labs and tests       Follow up with primary care provider, Tish Osborne, within 7 days for   hospital follow- up.  No follow up labs or test are needed.            Discharge Disposition   Discharged to home  Condition at discharge: Stable    Hospital Course   Misha Castro is a 64 year old male with past medical history of concern for retinal artery branch occlusion s/p hyperbaric oxygen therapy, hydrocele, IBS, HLD, HTN, BPPV now presents on 7/29/2024 with transient right vision loss. He is being admitted for possible TIA & ongoing stroke workup.      Amaurosis fugax of right eye  Left M2 proximal short segment narrowing  Possible left thalamic lacunar infarct, age indeterminate    History of transient right vision loss suspected possible TIA related to hypertension in 2008 & 2018.     Onset right vision \"squiggles\" & right lateral vision loss 4pm 7/29 with generalized headache. Symptoms lasted 1hr then began to improve & are completely resolved at time of admission. No associated weakness or falls. Stroke code in ER upon arrival: CT shows possible left thalamic lacunar infarct, age indeterminate. CTA head & neck shows short segment moderate to marked narrowing proximal left M2 which may involve very distal aspect of left M1, with otherwise normal intracranial circulation & no significant narrowing in neck vessels.     Stroke neurology was consulted " & has provided recommendations. Received loading dose asa & plavix in ER. Echo with normal EF (55-60%), normal RV, no pericardial effusion, and trace MR and TR. Relatively unchanged from prior echo studies. HBA1c 5.7%. Lipid panel within range (LDL 72)  - Aspirin 81mg daily and Clopidogrel 75 mg daily for 21 days, then  mg indefinitely   - Increased PTA atorvastatin from 40 mg to 80 mg   - Outpatient sleep study   - Follow up with PCP in 1-2 weeks   - Follow up with Neurology in 6-8 weeks     Hypertension  Hypertensive on admission. Per stroke neuro, NO permissive hypertension. Managed PTA with amlodipine 10mg daily, losartan-hydrochlorothiazide 50-12.5mg daily, spironolactone 12.5mg daily.   -Continue PTA amlodipine, losartan-hydrochlorothiazide, & spironolactone     Benign paroxysmal positional vertigo  Noted in history. Denies symptoms in recent past surrounding time of vision loss & stroke symptoms, above. Monitor.      Mixed hyperlipidemia  Managed PTA with atorvastatin 40mg daily, continue.     GERD  Managed PTA with famotidine 10mg daily PRN, continue.      Hydrocele  Noted in history, sliding s/p treatment without acute concerns at time of admission. No acute interventions.     Irritable bowel syndrome  Noted in history, denies symptoms at time of admission. Managed PTA with fiber supplement. No acute interventions.    Consultations This Hospital Stay   NEUROLOGY IP STROKE CONSULT  NEUROLOGY IP STROKE CONSULT  SPEECH LANGUAGE PATH ADULT IP CONSULT  PHARMACY IP CONSULT  PHARMACY IP CONSULT  PHARMACY IP CONSULT  PHYSICAL THERAPY ADULT IP CONSULT  OCCUPATIONAL THERAPY ADULT IP CONSULT  REHAB ADMISSIONS LIAISON IP CONSULT  CARE MANAGEMENT / SOCIAL WORK IP CONSULT  SMOKING CESSATION PROGRAM IP CONSULT    Code Status   Prior    Time Spent on this Encounter   IHoward DO, personally saw the patient today and spent greater than 30 minutes discharging this patient.       Howard Rocha DO  [facility]  [facility] MEDICAL SURGICAL  [address] [address]  Phone: [phone]  Fax: [phone]  ______________________________________________________________________    Physical Exam   Vital Signs:                    Weight: 0 lbs 0 oz  Constitutional: awake, alert, cooperative, no apparent distress, and appears stated age  Eyes: Lids and lashes normal, pupils equal, round and reactive to light, extra ocular muscles intact, sclera clear, conjunctiva normal  Respiratory: No increased work of breathing, good air exchange, clear to auscultation bilaterally, no crackles or wheezing  Cardiovascular: Normal apical impulse, regular rate and rhythm, normal S1 and S2, no S3 or S4, and no murmur noted  GI: No scars, normal bowel sounds, soft, non-distended, non-tender, no masses palpated, no hepatosplenomegally  Skin: normal skin color, texture, turgor  Musculoskeletal: There is no redness, warmth, or swelling of the joints.  Full range of motion noted.  Motor strength is 5 out of 5 all extremities bilaterally.  Tone is normal.  Neurologic: Awake, alert, oriented to name, place and time.  Cranial nerves II-XII are grossly intact.  Motor is 5 out of 5 bilaterally.  Cerebellar finger to nose, heel to shin intact.  Sensory is intact.  Babinski down going, Romberg negative, and gait is normal.       Primary Care Physician   Tish Osborne    Discharge Orders      Adult Sleep Eval & Management Referral      Reason for your hospital stay    Amaurosis fugax     Follow-up and recommended labs and tests     Follow up with primary care provider, Tish Osborne, within 7 days for hospital follow- up.  No follow up labs or test are needed.     Activity    Your activity upon discharge: activity as tolerated     Diet    Follow this diet upon discharge:   Regular Diet Adult     Stroke Hospital Follow Up    Please be aware that coverage of these services is subject to the terms and limitations of your health  insurance plan.  Call member services at your health plan with any benefit or coverage questions.  [employer] will call you to coordinate care as prescribed by your provider. If you don t hear from a representative within 2 business days, please call [phone].       JULIANA ERVIN MAIL OUT       Significant Results and Procedures   Most Recent 3 CBC's:  Recent Labs   Lab Test 07/30/24  0516 07/29/24 2021   WBC 9.3 10.1   HGB 15.2 15.7   MCV 86 85    229     Most Recent 3 BMP's:  Recent Labs   Lab Test 07/30/24  1058 07/30/24  0729 07/30/24  0516 07/30/24  0209 07/29/24 2021   NA  --   --  140  --  138   POTASSIUM  --   --  3.6  --  3.6   CHLORIDE  --   --  102  --  101   CO2  --   --  25  --  25   BUN  --   --  18.4  --  22.0   CR  --   --  1.04  --  1.05   ANIONGAP  --   --  13  --  12   NERIS  --   --  8.5*  --  8.8   * 107* 97   < > 113*    < > = values in this interval not displayed.     Most Recent Cholesterol Panel:  Recent Labs   Lab Test 07/29/24 2021   CHOL 144   LDL 72   HDL 46   TRIG 129     Most Recent Hemoglobin A1c:  Recent Labs   Lab Test 07/29/24 2021   A1C 5.7*   ,   Results for orders placed or performed during the hospital encounter of 07/29/24   CT Head w/o Contrast    Narrative    EXAM: CT HEAD W/O CONTRAST  LOCATION: [facility]  DATE: 7/29/2024    INDICATION: Transient left eye visual loss.  COMPARISON: None.  TECHNIQUE: Routine CT Head without IV contrast. Multiplanar reformats. Dose reduction techniques were used.    FINDINGS:  No evidence of hemorrhage, mass, or hydrocephalus. Possible left thalamic lacunar infarct, age indeterminate. Background of volume loss and nonspecific white matter hypoattenuation presumably representing chronic small vessel ischemic change. No acute   osseous abnormality.      Impression    IMPRESSION:  1.  No evidence of hemorrhage.  2.  Possible left thalamic lacunar infarct, age indeterminate.   CTA Head Neck with  Contrast    Narrative    EXAM: CTA HEAD NECK W CONTRAST  LOCATION: [facility]  DATE: 7/29/2024    INDICATION: transient left eye visual loss  COMPARISON: None.  CONTRAST: 67 mL Isovue 370  TECHNIQUE: Head and neck CT angiogram with IV contrast. Axial helical CT images of the head and neck vessels obtained during the arterial phase of intravenous contrast administration. Axial 2D reconstructed images and multiplanar 3D MIP reconstructed   images of the head and neck vessels were performed by the technologist. Dose reduction techniques were used. All stenosis measurements made according to NASCET criteria unless otherwise specified.    FINDINGS:   HEAD CTA:  ANTERIOR CIRCULATION: Short segment moderate to marked narrowing proximal left M2 which may involve very distal aspect of left M1. Standard [place] anatomy.    POSTERIOR CIRCULATION: No stenosis/occlusion, aneurysm, or high flow vascular malformation. Balanced vertebral arteries supply a normal basilar artery.     DURAL VENOUS SINUSES: Expected enhancement of the major dural venous sinuses.    NECK CTA:  RIGHT CAROTID: No measurable stenosis or dissection.    LEFT CAROTID: No measurable stenosis or dissection.    VERTEBRAL ARTERIES: No focal stenosis or dissection. Balanced vertebral arteries.    AORTIC ARCH: Classic aortic arch anatomy with no significant stenosis at the origin of the great vessels.    NONVASCULAR STRUCTURES: Moderate canal narrowing C5-6.      Impression    IMPRESSION:     HEAD CTA:   1.  Short segment moderate to marked narrowing proximal left M2 which may involve very distal aspect of left M1.  2.  Intracranial circulation appears otherwise normal.    NECK CTA:  1.  No hemodynamically significant narrowing throughout major neck vessels.   MR Brain w/o & w Contrast    Narrative    MRI BRAIN WITHOUT AND WITH CONTRAST  7/30/2024 9:49 AM     HISTORY: Transient right-sided vision loss.     TECHNIQUE:  Multiplanar, multisequence MRI of the brain without and  with 9 mL GADAVIST.     COMPARISON: Head CT 2024.     FINDINGS: There is no evidence of acute infarct, hemorrhage, mass, or  herniation. Mild patchy periventricular white matter T2  hyperintensities which are nonspecific, but likely related to chronic  microvascular ischemic disease. Small lacunar infarct in the ventral  left thalamus. Ventricular size within normal limits without  hydrocephalus.     There is no abnormal intracranial enhancement.     The facial structures appear normal. The major arterial T2 flow voids  at the base of the brain appear patent.       Impression    IMPRESSION:    1. No evidence of acute infarct, mass, hemorrhage, or herniation.  2. Mild nonspecific white matter changes likely due to chronic  microvascular ischemic disease.   3. Small chronic lacunar infarct in the ventral left thalamus.       PRUDENCE LATHAM MD         SYSTEM ID:  [id number]   Echocardiogram Complete - For age > 60 yrs     Value    LVEF  55-60%    Narrative    [id number]  [id number]  [id number]  [id number]^ROBERTO^CATHI^HODAN     [facility]  Echocardiography Laboratory  [address] [place] [address]     Name: MARIYA XIAO  MRN: [id number]  : 1960  Study Date: 2024 10:01 AM  Age: 64 yrs  Gender: Male  Patient Location: [facility]  Reason For Study: Cerebrovascular Incident  Ordering Physician: CATHI MEJIA  Referring Physician: Tish Osborne  Performed By: Chantal Herman RDCS     BSA: 2.0 m2  Height: 67 in  Weight: 202 lb  HR: 96  BP: 145/96 mmHg  ______________________________________________________________________________  Procedure  Complete Echo Adult.  ______________________________________________________________________________  Interpretation Summary     1. The left ventricle is normal in size. There is mild concentric left  ventricular hypertrophy. Left ventricular systolic function is normal. The  visual  ejection fraction is 55-60%. Diastolic Doppler findings (E/E' ratio  and/or other parameters) suggest left ventricular filling pressures are  normal. No regional wall motion abnormalities noted.  2. The right ventricle is normal size. The right ventricular systolic function  is normal.  3. Trace mitral and tricuspid regurgitation.  4. No pericardial effusion.  5. No previous study for comparison.  ______________________________________________________________________________  Left Ventricle  The left ventricle is normal in size. There is mild concentric left  ventricular hypertrophy. Left ventricular systolic function is normal. The  visual ejection fraction is 55-60%. Diastolic Doppler findings (E/E' ratio  and/or other parameters) suggest left ventricular filling pressures are  normal. No regional wall motion abnormalities noted.     Right Ventricle  The right ventricle is normal size. The right ventricular systolic function is  normal.     Atria  Normal left atrial size. Right atrial size is normal. There is no color  Doppler evidence of an atrial shunt.     Mitral Valve  There is trace mitral regurgitation.     Tricuspid Valve  There is trace tricuspid regurgitation. Right ventricular systolic pressure  could not be approximated due to inadequate tricuspid regurgitation.     Aortic Valve  There is mild trileaflet aortic sclerosis. No aortic regurgitation is present.  No aortic stenosis is present.     Pulmonic Valve  There is no pulmonic valvular stenosis.     Vessels  The aortic root is normal size. Ascending aorta dilatation is present.  Descending aortic velocity normal. The inferior vena cava is normal.     Pericardium  There is no pericardial effusion.     Rhythm  Sinus rhythm was noted.  ______________________________________________________________________________  MMode/2D Measurements & Calculations  IVSd: 1.3 cm     LVIDd: 4.7 cm  LVIDs: 2.9 cm  LVPWd: 1.2 cm  FS: 37.4 %  LV mass(C)d: 215.1 grams  LV  mass(C)dI: 105.9 grams/m2  Ao root diam: 3.7 cm  LA dimension: 3.5 cm  asc Aorta Diam: 4.1 cm  LA/Ao: 0.95  Ao root diam index Ht(cm/m): 2.2  Ao root diam index BSA (cm/m2): 1.8  Asc Ao diam index BSA (cm/m2): 2.0  Asc Ao diam index Ht(cm/m): 2.4  LA Volume (BP): 30.5 ml     LA Volume Index (BP): 15.0 ml/m2  RV Base: 3.0 cm  RWT: 0.49  TAPSE: 2.3 cm     Doppler Measurements & Calculations  MV E max luiz: 57.5 cm/sec  MV A max luiz: 81.8 cm/sec  MV E/A: 0.70  MV dec slope: 379.6 cm/sec2  MV dec time: 0.15 sec  PA acc time: 0.09 sec  E/E' av.5  Lateral E/e': 9.5  Medial E/e': 7.4  RV S Luiz: 18.5 cm/sec     ______________________________________________________________________________  Report approved by: Lamine Alanis 2024 03:32 PM             Discharge Medications   Discharge Medication List as of 2024  3:17 PM        START taking these medications    Details   aspirin (ASA) 325 MG EC tablet Take 1 tablet (325 mg) by mouth daily, Disp-90 tablet, R-0, E-Prescribe      clopidogrel (PLAVIX) 75 MG tablet Take 1 tablet (75 mg) by mouth or NG Tube daily for 20 days, Disp-20 tablet, R-0, E-Prescribe           CONTINUE these medications which have CHANGED    Details   aspirin (ASA) 81 MG chewable tablet Take 1 tablet (81 mg) by mouth every evening for 20 days, Disp-20 tablet, R-0, E-Prescribe      atorvastatin (LIPITOR) 80 MG tablet Take 1 tablet (80 mg) by mouth at bedtime, Disp-30 tablet, R-0, E-Prescribe           CONTINUE these medications which have NOT CHANGED    Details   amLODIPine (NORVASC) 10 MG tablet Take 1 tablet by mouth every evening, Historical      Calcium Polycarbophil (FIBER) 625 MG tablet Take 1 tablet by mouth every evening, Historical      famotidine (PEPCID) 10 MG tablet Take 10 mg by mouth daily as needed (heartburn), Historical      losartan-hydrochlorothiazide (HYZAAR) 50-12.5 MG tablet Take 1 tablet by mouth daily, Historical      spironolactone (ALDACTONE) 25 MG tablet Take 0.5  tablets by mouth every evening, Historical      vitamin D3 (CHOLECALCIFEROL) 50 mcg (2000 units) tablet Take 1 tablet by mouth every evening, Historical           Allergies   No Known Allergies

## 2024-08-23 PROCEDURE — 93248 EXT ECG>7D<15D REV&INTERPJ: CPT | Performed by: INTERNAL MEDICINE

## 2024-09-16 ENCOUNTER — ANESTHESIA EVENT (OUTPATIENT)
Dept: SURGERY | Facility: CLINIC | Age: 64
End: 2024-09-16
Payer: COMMERCIAL

## 2024-09-16 NOTE — ANESTHESIA PREPROCEDURE EVALUATION
Anesthesia Pre-Procedure Evaluation    Patient: Misha Castro   MRN: [id number] : 1960        Procedure : Procedure(s):  Knee arthroscopy with partial medial meniscectomy          Past Medical History:   Diagnosis Date     Gastroesophageal reflux disease      Hypertension       Past Surgical History:   Procedure Laterality Date     COLONOSCOPY  2003      No Known Allergies   Social History     Tobacco Use     Smoking status: Never     Smokeless tobacco: Never   Substance Use Topics     Alcohol use: Yes     Comment: occ      Wt Readings from Last 1 Encounters:   09 91.6 kg (202 lb)        Anesthesia Evaluation   Pt has had prior anesthetic.         ROS/MED HX  ENT/Pulmonary:       Neurologic: Comment: vertigo - neg neurologic ROS     Cardiovascular:     (+) Dyslipidemia hypertension- -   -  - -                                 Previous cardiac testing   Echo: Date: 2024 Results:  Interpretation Summary     1. The left ventricle is normal in size. There is mild concentric left  ventricular hypertrophy. Left ventricular systolic function is normal. The  visual ejection fraction is 55-60%. Diastolic Doppler findings (E/E' ratio  and/or other parameters) suggest left ventricular filling pressures are  normal. No regional wall motion abnormalities noted.  2. The right ventricle is normal size. The right ventricular systolic function  is normal.  3. Trace mitral and tricuspid regurgitation.  4. No pericardial effusion.  5. No previous study for comparison.  _____________________________________    Stress Test:  Date: Results:    ECG Reviewed:  Date: Results:    Cath:  Date: Results:      METS/Exercise Tolerance:     Hematologic:       Musculoskeletal:       GI/Hepatic: Comment: IBS    (+) GERD,                   Renal/Genitourinary:  - neg Renal ROS     Endo:  - neg endo ROS     Psychiatric/Substance Use:  - neg psychiatric ROS     Infectious Disease:  - neg infectious disease ROS     Malignancy:     "   Other:            Physical Exam    Airway        Mallampati: II   TM distance: > 3 FB   Neck ROM: full   Mouth opening: > 3 cm    Respiratory Devices and Support         Dental       (+) Minor Abnormalities - some fillings, tiny chips      Cardiovascular   cardiovascular exam normal          Pulmonary   pulmonary exam normal              OUTSIDE LABS:  CBC:   Lab Results   Component Value Date    WBC 9.3 07/30/2024    WBC 10.1 07/29/2024    HGB 15.2 07/30/2024    HGB 15.7 07/29/2024    HCT 43.7 07/30/2024    HCT 43.9 07/29/2024     07/30/2024     07/29/2024     BMP:   Lab Results   Component Value Date     07/30/2024     07/29/2024    POTASSIUM 3.6 07/30/2024    POTASSIUM 3.6 07/29/2024    CHLORIDE 102 07/30/2024    CHLORIDE 101 07/29/2024    CO2 25 07/30/2024    CO2 25 07/29/2024    BUN 18.4 07/30/2024    BUN 22.0 07/29/2024    CR 1.04 07/30/2024    CR 1.05 07/29/2024     (H) 07/30/2024     (H) 07/30/2024     COAGS:   Lab Results   Component Value Date    PTT 26 07/29/2024    INR 1.00 07/29/2024     POC: No results found for: \"BGM\", \"HCG\", \"HCGS\"  HEPATIC:   Lab Results   Component Value Date    ALBUMIN 4.5 01/08/2009    PROTTOTAL 8.3 01/08/2009    ALT 27 01/08/2009    AST 28 01/08/2009    ALKPHOS 81 01/08/2009    BILITOTAL 0.6 01/08/2009     OTHER:   Lab Results   Component Value Date    A1C 5.7 (H) 07/29/2024    NERIS 8.5 (L) 07/30/2024    LIPASE 97 01/08/2009       Anesthesia Plan    ASA Status:  2    NPO Status:  NPO Appropriate    Anesthesia Type: Spinal.   Induction: Intravenous.   Maintenance: TIVA.        Consents    Anesthesia Plan(s) and associated risks, benefits, and realistic alternatives discussed. Questions answered and patient/representative(s) expressed understanding.     - Discussed: Risks, Benefits and Alternatives for BOTH SEDATION and the PROCEDURE were discussed     - Discussed with:  Patient            Postoperative Care    Pain management: IV " analgesics, Oral pain medications, Multi-modal analgesia.   PONV prophylaxis: Ondansetron (or other 5HT-3), Dexamethasone or Solumedrol, Background Propofol Infusion     Comments:               RACHELLE Aldana CRNA    I have reviewed the pertinent notes and labs in the chart from the past 30 days and (re)examined the patient.  Any updates or changes from those notes are reflected in this note.

## 2024-09-17 ENCOUNTER — ANESTHESIA (OUTPATIENT)
Dept: SURGERY | Facility: CLINIC | Age: 64
End: 2024-09-17
Payer: COMMERCIAL

## 2024-09-17 ENCOUNTER — HOSPITAL ENCOUNTER (OUTPATIENT)
Facility: CLINIC | Age: 64
Discharge: HOME | End: 2024-09-17
Attending: ORTHOPAEDIC SURGERY | Admitting: ORTHOPAEDIC SURGERY
Payer: COMMERCIAL

## 2024-09-17 VITALS
TEMPERATURE: 98 F | DIASTOLIC BLOOD PRESSURE: 67 MMHG | HEART RATE: 82 BPM | BODY MASS INDEX: 31.64 KG/M2 | OXYGEN SATURATION: 95 % | SYSTOLIC BLOOD PRESSURE: 124 MMHG | RESPIRATION RATE: 16 BRPM | WEIGHT: 202 LBS

## 2024-09-17 DIAGNOSIS — S83.249A MEDIAL MENISCUS TEAR: ICD-10-CM

## 2024-09-17 DIAGNOSIS — S83.231A COMPLEX TEAR OF MEDIAL MENISCUS OF RIGHT KNEE AS CURRENT INJURY, INITIAL ENCOUNTER: Primary | ICD-10-CM

## 2024-09-17 PROCEDURE — 710N000009 HC RECOVERY PHASE 1, LEVEL 1, PER MIN: Performed by: ORTHOPAEDIC SURGERY

## 2024-09-17 PROCEDURE — 250N000011 HC RX IP 250 OP 636: Performed by: ORTHOPAEDIC SURGERY

## 2024-09-17 PROCEDURE — 360N000077 HC SURGERY LEVEL 4, PER MIN: Performed by: ORTHOPAEDIC SURGERY

## 2024-09-17 PROCEDURE — 250N000013 HC RX MED GY IP 250 OP 250 PS 637: Performed by: NURSE ANESTHETIST, CERTIFIED REGISTERED

## 2024-09-17 PROCEDURE — 710N000012 HC RECOVERY PHASE 2, PER MINUTE: Performed by: ORTHOPAEDIC SURGERY

## 2024-09-17 PROCEDURE — 258N000003 HC RX IP 258 OP 636: Performed by: NURSE ANESTHETIST, CERTIFIED REGISTERED

## 2024-09-17 PROCEDURE — 250N000011 HC RX IP 250 OP 636: Performed by: NURSE ANESTHETIST, CERTIFIED REGISTERED

## 2024-09-17 PROCEDURE — 370N000017 HC ANESTHESIA TECHNICAL FEE, PER MIN: Performed by: ORTHOPAEDIC SURGERY

## 2024-09-17 PROCEDURE — 272N000001 HC OR GENERAL SUPPLY STERILE: Performed by: ORTHOPAEDIC SURGERY

## 2024-09-17 PROCEDURE — 250N000009 HC RX 250: Performed by: NURSE ANESTHETIST, CERTIFIED REGISTERED

## 2024-09-17 PROCEDURE — 999N000141 HC STATISTIC PRE-PROCEDURE NURSING ASSESSMENT: Performed by: ORTHOPAEDIC SURGERY

## 2024-09-17 PROCEDURE — 250N000011 HC RX IP 250 OP 636

## 2024-09-17 PROCEDURE — 250N000009 HC RX 250: Performed by: ORTHOPAEDIC SURGERY

## 2024-09-17 RX ORDER — NALOXONE HYDROCHLORIDE 0.4 MG/ML
0.1 INJECTION, SOLUTION INTRAMUSCULAR; INTRAVENOUS; SUBCUTANEOUS
Status: DISCONTINUED | OUTPATIENT
Start: 2024-09-17 | End: 2024-09-17 | Stop reason: HOSPADM

## 2024-09-17 RX ORDER — MEPERIDINE HYDROCHLORIDE 25 MG/ML
12.5 INJECTION INTRAMUSCULAR; INTRAVENOUS; SUBCUTANEOUS EVERY 5 MIN PRN
Status: DISCONTINUED | OUTPATIENT
Start: 2024-09-17 | End: 2024-09-17 | Stop reason: HOSPADM

## 2024-09-17 RX ORDER — NALOXONE HYDROCHLORIDE 0.4 MG/ML
0.2 INJECTION, SOLUTION INTRAMUSCULAR; INTRAVENOUS; SUBCUTANEOUS
Status: DISCONTINUED | OUTPATIENT
Start: 2024-09-17 | End: 2024-09-17 | Stop reason: HOSPADM

## 2024-09-17 RX ORDER — HYDROMORPHONE HCL IN WATER/PF 6 MG/30 ML
0.2 PATIENT CONTROLLED ANALGESIA SYRINGE INTRAVENOUS EVERY 5 MIN PRN
Status: DISCONTINUED | OUTPATIENT
Start: 2024-09-17 | End: 2024-09-17 | Stop reason: HOSPADM

## 2024-09-17 RX ORDER — SODIUM CHLORIDE, SODIUM LACTATE, POTASSIUM CHLORIDE, CALCIUM CHLORIDE 600; 310; 30; 20 MG/100ML; MG/100ML; MG/100ML; MG/100ML
INJECTION, SOLUTION INTRAVENOUS CONTINUOUS
Status: DISCONTINUED | OUTPATIENT
Start: 2024-09-17 | End: 2024-09-17 | Stop reason: HOSPADM

## 2024-09-17 RX ORDER — ONDANSETRON 2 MG/ML
4 INJECTION INTRAMUSCULAR; INTRAVENOUS EVERY 30 MIN PRN
Status: DISCONTINUED | OUTPATIENT
Start: 2024-09-17 | End: 2024-09-17 | Stop reason: HOSPADM

## 2024-09-17 RX ORDER — LIDOCAINE HYDROCHLORIDE 20 MG/ML
INJECTION, SOLUTION INFILTRATION; PERINEURAL PRN
Status: DISCONTINUED | OUTPATIENT
Start: 2024-09-17 | End: 2024-09-17

## 2024-09-17 RX ORDER — PROPOFOL 10 MG/ML
INJECTION, EMULSION INTRAVENOUS CONTINUOUS PRN
Status: DISCONTINUED | OUTPATIENT
Start: 2024-09-17 | End: 2024-09-17

## 2024-09-17 RX ORDER — CEFAZOLIN SODIUM/WATER 2 G/20 ML
2 SYRINGE (ML) INTRAVENOUS
Status: COMPLETED | OUTPATIENT
Start: 2024-09-17 | End: 2024-09-17

## 2024-09-17 RX ORDER — GABAPENTIN 300 MG/1
300 CAPSULE ORAL
Status: COMPLETED | OUTPATIENT
Start: 2024-09-17 | End: 2024-09-17

## 2024-09-17 RX ORDER — LIDOCAINE HYDROCHLORIDE AND EPINEPHRINE 10; 10 MG/ML; UG/ML
INJECTION, SOLUTION INFILTRATION; PERINEURAL PRN
Status: DISCONTINUED | OUTPATIENT
Start: 2024-09-17 | End: 2024-09-17 | Stop reason: HOSPADM

## 2024-09-17 RX ORDER — CEFAZOLIN SODIUM/WATER 2 G/20 ML
2 SYRINGE (ML) INTRAVENOUS SEE ADMIN INSTRUCTIONS
Status: DISCONTINUED | OUTPATIENT
Start: 2024-09-17 | End: 2024-09-17 | Stop reason: HOSPADM

## 2024-09-17 RX ORDER — HYDROXYZINE HYDROCHLORIDE 25 MG/1
25 TABLET, FILM COATED ORAL EVERY 6 HOURS PRN
Status: DISCONTINUED | OUTPATIENT
Start: 2024-09-17 | End: 2024-09-17 | Stop reason: HOSPADM

## 2024-09-17 RX ORDER — LIDOCAINE 40 MG/G
CREAM TOPICAL
Status: DISCONTINUED | OUTPATIENT
Start: 2024-09-17 | End: 2024-09-17 | Stop reason: HOSPADM

## 2024-09-17 RX ORDER — PROPOFOL 10 MG/ML
INJECTION, EMULSION INTRAVENOUS PRN
Status: DISCONTINUED | OUTPATIENT
Start: 2024-09-17 | End: 2024-09-17

## 2024-09-17 RX ORDER — FENTANYL CITRATE 50 UG/ML
25 INJECTION, SOLUTION INTRAMUSCULAR; INTRAVENOUS EVERY 5 MIN PRN
Status: DISCONTINUED | OUTPATIENT
Start: 2024-09-17 | End: 2024-09-17 | Stop reason: HOSPADM

## 2024-09-17 RX ORDER — ACETAMINOPHEN 325 MG/1
975 TABLET ORAL ONCE
Status: COMPLETED | OUTPATIENT
Start: 2024-09-17 | End: 2024-09-17

## 2024-09-17 RX ORDER — FENTANYL CITRATE 50 UG/ML
50 INJECTION, SOLUTION INTRAMUSCULAR; INTRAVENOUS EVERY 5 MIN PRN
Status: DISCONTINUED | OUTPATIENT
Start: 2024-09-17 | End: 2024-09-17 | Stop reason: HOSPADM

## 2024-09-17 RX ORDER — NALOXONE HYDROCHLORIDE 0.4 MG/ML
0.4 INJECTION, SOLUTION INTRAMUSCULAR; INTRAVENOUS; SUBCUTANEOUS
Status: DISCONTINUED | OUTPATIENT
Start: 2024-09-17 | End: 2024-09-17 | Stop reason: HOSPADM

## 2024-09-17 RX ORDER — DEXAMETHASONE SODIUM PHOSPHATE 10 MG/ML
INJECTION, SOLUTION INTRAMUSCULAR; INTRAVENOUS
Status: DISCONTINUED | OUTPATIENT
Start: 2024-09-17 | End: 2024-09-17

## 2024-09-17 RX ORDER — DEXAMETHASONE SODIUM PHOSPHATE 4 MG/ML
4 INJECTION, SOLUTION INTRA-ARTICULAR; INTRALESIONAL; INTRAMUSCULAR; INTRAVENOUS; SOFT TISSUE
Status: DISCONTINUED | OUTPATIENT
Start: 2024-09-17 | End: 2024-09-17 | Stop reason: HOSPADM

## 2024-09-17 RX ORDER — DIMENHYDRINATE 50 MG/ML
25 INJECTION, SOLUTION INTRAMUSCULAR; INTRAVENOUS
Status: DISCONTINUED | OUTPATIENT
Start: 2024-09-17 | End: 2024-09-17 | Stop reason: HOSPADM

## 2024-09-17 RX ORDER — DEXAMETHASONE SODIUM PHOSPHATE 4 MG/ML
INJECTION, SOLUTION INTRA-ARTICULAR; INTRALESIONAL; INTRAMUSCULAR; INTRAVENOUS; SOFT TISSUE PRN
Status: DISCONTINUED | OUTPATIENT
Start: 2024-09-17 | End: 2024-09-17

## 2024-09-17 RX ORDER — ONDANSETRON 2 MG/ML
INJECTION INTRAMUSCULAR; INTRAVENOUS PRN
Status: DISCONTINUED | OUTPATIENT
Start: 2024-09-17 | End: 2024-09-17

## 2024-09-17 RX ORDER — OXYCODONE HYDROCHLORIDE 5 MG/1
5 TABLET ORAL
Status: DISCONTINUED | OUTPATIENT
Start: 2024-09-17 | End: 2024-09-17 | Stop reason: HOSPADM

## 2024-09-17 RX ORDER — KETOROLAC TROMETHAMINE 15 MG/ML
15 INJECTION, SOLUTION INTRAMUSCULAR; INTRAVENOUS
Status: DISCONTINUED | OUTPATIENT
Start: 2024-09-17 | End: 2024-09-17 | Stop reason: HOSPADM

## 2024-09-17 RX ORDER — OXYCODONE HYDROCHLORIDE 5 MG/1
5-10 TABLET ORAL EVERY 4 HOURS PRN
Qty: 16 TABLET | Refills: 0 | Status: SHIPPED | OUTPATIENT
Start: 2024-09-17

## 2024-09-17 RX ORDER — ROPIVACAINE HYDROCHLORIDE 5 MG/ML
INJECTION, SOLUTION EPIDURAL; INFILTRATION; PERINEURAL
Status: DISCONTINUED | OUTPATIENT
Start: 2024-09-17 | End: 2024-09-17

## 2024-09-17 RX ORDER — HYDROMORPHONE HCL IN WATER/PF 6 MG/30 ML
0.4 PATIENT CONTROLLED ANALGESIA SYRINGE INTRAVENOUS EVERY 5 MIN PRN
Status: DISCONTINUED | OUTPATIENT
Start: 2024-09-17 | End: 2024-09-17 | Stop reason: HOSPADM

## 2024-09-17 RX ORDER — ONDANSETRON 4 MG/1
4 TABLET, ORALLY DISINTEGRATING ORAL EVERY 30 MIN PRN
Status: DISCONTINUED | OUTPATIENT
Start: 2024-09-17 | End: 2024-09-17 | Stop reason: HOSPADM

## 2024-09-17 RX ORDER — MAGNESIUM SULFATE HEPTAHYDRATE 40 MG/ML
2 INJECTION, SOLUTION INTRAVENOUS ONCE
Status: COMPLETED | OUTPATIENT
Start: 2024-09-17 | End: 2024-09-17

## 2024-09-17 RX ORDER — FENTANYL CITRATE 50 UG/ML
25-100 INJECTION, SOLUTION INTRAMUSCULAR; INTRAVENOUS
Status: DISCONTINUED | OUTPATIENT
Start: 2024-09-17 | End: 2024-09-17 | Stop reason: HOSPADM

## 2024-09-17 RX ADMIN — PROPOFOL 200 MG: 10 INJECTION, EMULSION INTRAVENOUS at 07:43

## 2024-09-17 RX ADMIN — PROPOFOL 200 MCG/KG/MIN: 10 INJECTION, EMULSION INTRAVENOUS at 07:44

## 2024-09-17 RX ADMIN — ACETAMINOPHEN 975 MG: 325 TABLET ORAL at 06:21

## 2024-09-17 RX ADMIN — PHENYLEPHRINE HYDROCHLORIDE 100 MCG: 10 INJECTION INTRAVENOUS at 07:54

## 2024-09-17 RX ADMIN — PHENYLEPHRINE HYDROCHLORIDE 200 MCG: 10 INJECTION INTRAVENOUS at 07:56

## 2024-09-17 RX ADMIN — FENTANYL CITRATE 100 MCG: 50 INJECTION INTRAMUSCULAR; INTRAVENOUS at 07:43

## 2024-09-17 RX ADMIN — HYDROMORPHONE HYDROCHLORIDE 0.5 MG: 1 INJECTION, SOLUTION INTRAMUSCULAR; INTRAVENOUS; SUBCUTANEOUS at 08:23

## 2024-09-17 RX ADMIN — Medication 2 G: at 07:38

## 2024-09-17 RX ADMIN — PHENYLEPHRINE HYDROCHLORIDE 200 MCG: 10 INJECTION INTRAVENOUS at 08:33

## 2024-09-17 RX ADMIN — DEXAMETHASONE SODIUM PHOSPHATE 4 MG: 10 INJECTION, SOLUTION INTRAMUSCULAR; INTRAVENOUS at 09:05

## 2024-09-17 RX ADMIN — SODIUM CHLORIDE, POTASSIUM CHLORIDE, SODIUM LACTATE AND CALCIUM CHLORIDE: 600; 310; 30; 20 INJECTION, SOLUTION INTRAVENOUS at 06:38

## 2024-09-17 RX ADMIN — MAGNESIUM SULFATE IN WATER 2 G: 40 INJECTION, SOLUTION INTRAVENOUS at 09:19

## 2024-09-17 RX ADMIN — ONDANSETRON 4 MG: 2 INJECTION INTRAMUSCULAR; INTRAVENOUS at 07:54

## 2024-09-17 RX ADMIN — GABAPENTIN 300 MG: 300 CAPSULE ORAL at 06:22

## 2024-09-17 RX ADMIN — PHENYLEPHRINE HYDROCHLORIDE 200 MCG: 10 INJECTION INTRAVENOUS at 08:11

## 2024-09-17 RX ADMIN — LIDOCAINE HYDROCHLORIDE 100 MG: 20 INJECTION, SOLUTION INFILTRATION; PERINEURAL at 07:43

## 2024-09-17 RX ADMIN — PHENYLEPHRINE HYDROCHLORIDE 200 MCG: 10 INJECTION INTRAVENOUS at 08:41

## 2024-09-17 RX ADMIN — PHENYLEPHRINE HYDROCHLORIDE 200 MCG: 10 INJECTION INTRAVENOUS at 08:35

## 2024-09-17 RX ADMIN — DEXAMETHASONE SODIUM PHOSPHATE 4 MG: 4 INJECTION, SOLUTION INTRA-ARTICULAR; INTRALESIONAL; INTRAMUSCULAR; INTRAVENOUS; SOFT TISSUE at 07:54

## 2024-09-17 RX ADMIN — LIDOCAINE HYDROCHLORIDE 0.1 ML: 10 INJECTION, SOLUTION EPIDURAL; INFILTRATION; INTRACAUDAL; PERINEURAL at 06:38

## 2024-09-17 RX ADMIN — ROPIVACAINE HYDROCHLORIDE 20 ML: 5 INJECTION, SOLUTION EPIDURAL; INFILTRATION; PERINEURAL at 09:05

## 2024-09-17 RX ADMIN — MIDAZOLAM 2 MG: 1 INJECTION INTRAMUSCULAR; INTRAVENOUS at 07:41

## 2024-09-17 ASSESSMENT — ACTIVITIES OF DAILY LIVING (ADL)
ADLS_ACUITY_SCORE: 31
ADLS_ACUITY_SCORE: 32
ADLS_ACUITY_SCORE: 31

## 2024-09-17 NOTE — OR NURSING
Report from Margy SERVIN.Pt alert but a bit drowsy. Wife feeding him banana bread. Denies pain. VSS. Ice pack to right knee.

## 2024-09-17 NOTE — OR NURSING
Pt states not having any pain  in right knee. Ace wrap intact VSS. No c/o nausea. Moe po fluids.CMS good.

## 2024-09-17 NOTE — ANESTHESIA POSTPROCEDURE EVALUATION
Patient: Mihsa Castro    Procedure: Procedure(s):  Knee arthroscopy with partial medial meniscectomy and loose body removal       Anesthesia Type:  General    Note:  Disposition: Outpatient   Postop Pain Control: Uneventful            Sign Out: Well controlled pain   PONV: No   Neuro/Psych: Uneventful            Sign Out: Acceptable/Baseline neuro status   Airway/Respiratory: Uneventful            Sign Out: Acceptable/Baseline resp. status   CV/Hemodynamics: Uneventful            Sign Out: Acceptable CV status; No obvious hypovolemia; No obvious fluid overload   Other NRE: NONE   DID A NON-ROUTINE EVENT OCCUR? No           Last vitals:  Vitals Value Taken Time   /70 09/17/24 0930   Temp 36.6  C (97.8  F) 09/17/24 0930   Pulse 75 09/17/24 0930   Resp 16 09/17/24 0930   SpO2 93 % 09/17/24 0930   Vitals shown include unfiled device data.    Electronically Signed By: Enzo Chen  September 17, 2024  10:43 AM

## 2024-09-17 NOTE — DISCHARGE INSTRUCTIONS
Same Day Surgery Discharge Instructions  Special Precautions After Surgery - Adult    It is not unusual to feel lightheaded or faint, up to 24 hours after surgery or while taking pain medication.  If you have these symptoms; sit for a few minutes before standing and have someone assist you when getting up.  You should rest and relax for the next 24 hours and must have someone stay with you for at least 24 hours after your discharge.  DO NOT DRIVE any vehicle or operate mechanical equipment for 24 hours following the end of your surgery.  DO NOT DRIVE while taking narcotic pain medications that have been prescribed by your physician.  If you had a limb operated on, you must be able to use it fully to drive.  DO NOT drink alcoholic beverages for 24 hours following surgery or while taking prescription pain medication.  Drink clear liquids (apple juice, ginger ale, broth, 7-Up, etc.).  Progress to your regular diet as you feel able.  Any questions call your physician and do not make important decisions for 24 hours.    Nausea and Vomiting: Nausea and vomiting can occur any time after receiving anesthesia. If you experience nausea and vomiting we encourage you to move to a clear liquid diet and advance your diet as tolerated. If nausea and vomiting do not improve within 12 hours please call the surgeon or present to the Emergency department.     Break-through Bleeding: If your experience bleeding from your surgical site apply pressure and additional dressing per nurse instruction. For simple problems such as a saturated dressing, you may need to reinforce the dressing with more gauze and tape and put slight pressure on the site. If bleeding does not subside contact the surgeon or present to the Emergency Department.    Post-op Infection: If you develop a fever of 100.4 or greater, have pus like drainage, redness, swelling or severe pain at the surgical site not alleviated with pain medications; please  "contact the surgeon or present to the Emergency Department.     Medications:  Tylenol next at 12:30pm  Ibuprofen anytime  Follow the instructions on the bottle.  __________________________________________________________________________________________________________________________________  IMPORTANT NUMBERS:    [facility] Main Number:  [phone], [phone]  Pharmacy:  [phone]  Same Day Surgery:  [phone], for general post-op questions call Monday - Thursday until 8:30 p.m., Fridays until 6:00 p.m.                                                                      [facility], podiatry:  [phone]  [facility] Orth[facility]:  [phone]     OB Clinic:  [phone]   General Surgery:  [phone]  Urology: [phone]  [facility] Center: [phone]       [facility] ANESTHESIA       DISCHARGE INSTRUCTIONS FOR PERIPHERAL NERVE BLOCK PATIENTS     The following nerve block was performed for your surgery:     [] Femoral Nerve Block/Adductor Canal Block: Front 2/3 of knee R/L     [] Sciatic/Saphenous/Popliteal Nerve Block: Below the knee R/L     [] Infraclavicular Nerve Block: Lower part of arm/hand/wrist R/L     [] Interscalene Nerve Block: Shoulder R/L     Your nerve block is expected to last until ______________ am/pm on ____________(date)   This is an estimation as to how long your nerve block will last. Depending on the medications used, your nerve block may wear off earlier or may last longer.     WHAT TO EXPECT AFTER A NERVE BLOCK   Nerve blocks affect many types of nerves, including nerves that control movement, pain, and normal sensation. Nerve blocks cause feelings such as:    Numbness    Tingling    Heaviness    Weakness or inability to move your arm or leg    A feeling that your arm or leg has \"fallen asleep\"     A nerve block can last for 2-36 hours or more, depending on the medications used. Usually the weakness wears off first. The tingling and heaviness " usually wear off next. Finally, you may start to notice pain. Keep in mind that this may occur in any order. Once a nerve block starts to wear off, it is usually completely gone within 60 minutes.     Certain nerve blocks may cause other symptoms. If you have had a shoulder block or a block near your collar bone, you may have symptoms such as:    Mild shortness of breath    A hoarse voice    Blurry vision    Unequal pupils    Drooping of your face on the same side as the nerve block     These are common side effects of this type of nerve block. These symptoms usually go away as the nerve block wears off. If these symptoms do not go away or you continue to feel the effects of the nerve block for longer than 48 hours, please call [facility] at [phone] and ask to speak to the anesthetist on call.     **If you have severe or prolonged shortness of breath, please go to the nearest emergency room.     PROTECTION OF A NUMB ARM OR LEG   After a nerve block, you cannot feel pain, pressure or extremes in temperature in the affected limb. Because your arm or leg is numb, it is more at risk for injury. For example, it is possible to burn your numb arm or leg on a hot stove without knowing it. Here are some helpful tips to help protect your arm or leg while it is numb:     While you are awake, change position of your arm or leg often. This helps to avoid putting too much pressure on the limb for long periods of time.   While sleeping, pad the limb with pillows to avoid rolling onto it or putting too much pressure onto it while you sleep. If you have had a shoulder or arm block, it is a good idea to sleep in a  recliner with pillows under your arm to avoid rolling onto your numb arm as you sleep.   If you have a cast or tight dressing, check the color of your fingers/toes every couple of hours. Call your surgeon if any fingers/toes look discolored.   If you have had a shoulder, arm or hand block, you may go home with a  sling. The sling will help to keep your arm in a safe position. Wear the sling at all times until the nerve block completely wears off.   If you have had a leg block, you may have difficulty bearing weight on that leg. You may be sent home with crutches to use until the nerve block wears off. Have someone assist you with walking until the nerve block completely wears off.   Use caution in cold weather. Your numb arm or leg will not be able to feel extremes in temperature. Be sure to cover your limb appropriately before going outside in order to prevent frostbite.   Ask your family or other support people to help with the above hints.     PAIN MEDICATION   It is recommended that you start your prescribed pain medication when you get home unless otherwise instructed and strongly recommended that you take your pain medication on a schedule as prescribed on the bottle. For example, if it says to use every 4 hours as needed for pain, then take it every 4 hours on a schedule (including at night-set your alarm) until the block wears off. After the block wears off, you can take the pain medication as needed.     Nausea is a common side effect of many pain medications. You may want to eat something before taking the pain medication to help prevent nausea.

## 2024-09-17 NOTE — ANESTHESIA PROCEDURE NOTES
Airway       Patient location during procedure: OR       Procedure Start/Stop Times: 9/17/2024 7:45 AM  Staff -        CRNA: Marianne Montaño APRN CRNA       Other Anesthesia Staff: Enzo Chen       Performed By: PEDRO LUIS and CRNA  Consent for Airway        Urgency: elective  Indications and Patient Condition       Indications for airway management: carolee-procedural       Induction type:intravenous       Mask difficulty assessment: 0 - not attempted    Final Airway Details       Final airway type: supraglottic airway    Supraglottic Airway Details        Type: LMA       Brand: Air-Q       LMA size: 5    Post intubation assessment        Placement verified by: capnometry, equal breath sounds and chest rise        Number of attempts at approach: 1       Number of other approaches attempted: 0       Secured with: commercial tube leung and tape       Ease of procedure: easy       Dentition: Intact    Medication(s) Administered   Medication Administration Time: 9/17/2024 7:45 AM

## 2024-09-17 NOTE — ANESTHESIA CARE TRANSFER NOTE
Patient: Misha Castro    Procedure: Procedure(s):  Knee arthroscopy with partial medial meniscectomy and loose body removal       Diagnosis: Medial meniscus tear [S83.249A]  Diagnosis Additional Information: No value filed.    Anesthesia Type:   General     Note:    Oropharynx: oropharynx clear of all foreign objects and spontaneously breathing  Level of Consciousness: drowsy  Oxygen Supplementation: face mask  Level of Supplemental Oxygen (L/min / FiO2): 6  Independent Airway: airway patency satisfactory and stable  Dentition: dentition unchanged  Vital Signs Stable: post-procedure vital signs reviewed and stable  Report to RN Given: handoff report given  Patient transferred to: PACU    Handoff Report: Identifed the Patient, Identified the Reponsible Provider, Reviewed the pertinent medical history, Discussed the surgical course, Reviewed Intra-OP anesthesia mangement and issues during anesthesia, Set expectations for post-procedure period and Allowed opportunity for questions and acknowledgement of understanding  Vitals:  Vitals Value Taken Time   /69 09/17/24 0905   Temp 36.3  C (97.3  F) 09/17/24 0854   Pulse 81 09/17/24 0908   Resp 79 09/17/24 0908   SpO2 96 % 09/17/24 0908   Vitals shown include unfiled device data.    Electronically Signed By: RACHELLE Sprague CRNA  September 17, 2024  9:09 AM

## 2024-09-17 NOTE — ANESTHESIA POSTPROCEDURE EVALUATION
Patient: Misha Castro    Procedure: Procedure(s):  Knee arthroscopy with partial medial meniscectomy and loose body removal       Anesthesia Type:  General    Note:  Anesthesia Post Evaluation    Last vitals:  Vitals Value Taken Time   /70 09/17/24 0930   Temp 36.6  C (97.8  F) 09/17/24 0930   Pulse 75 09/17/24 0930   Resp 16 09/17/24 0930   SpO2 93 % 09/17/24 0930   Vitals shown include unfiled device data.    Electronically Signed By: Enzo Chen  September 17, 2024  10:44 AM

## 2024-09-17 NOTE — PROCEDURES
PREOPERATIVE DIAGNOSIS:  Right knee medial meniscus tear     POSTOPERATIVE DIAGNOSIS:     1. Right knee medial meniscus tear   2. Right knee loose body    PROCEDURE:     1. Arthroscopic right knee partial medial meniscectomy.    2. Arthroscopic right knee loose body removal     SURGEON:  Gómez Moss MD      ASSISTANT: Ines Rascon PA-C.  The presence of a PA was necessary for positioning, retraction, leg leung and safe progression of the case.      ANESTHESIA:  General.      ESTIMATED BLOOD LOSS:  5 mL      IMPLANTS:  None.      COMPLICATIONS:  None apparent.      DISPOSITION:  Stable to PACU.      INTRAOPERATIVE FINDINGS:  Diagnostic arthroscopy showed diffuse grade II chondral wear under the central ridge of the patella with an intact trochlea in the patellofemoral joint.  In the notch, the ACL and PCL were intact and stable to probing.  In the lateral compartment, there was degenerative fraying of the anterior horn of the lateral meniscus with grade I-II chondral wear over the lateral plateau, the lateral femoral condyle was intact.  In the medial compartment, there was a large degenerative tear of the posterior horn of the medial meniscus with diffuse grade II-III chondral changes on the medial femoral condyle and grade II changes over the medial plateau.  There was a 5mm x 5mm loose chondral body in the medial compartment.    INDICATIONS:  Misha is a 64 year old male with a recent history of right knee pain and mechanical symptoms.  After discussing treatment options, he elected to proceed with arthroscopic right knee partial medial menisectomy, understanding the risks of ongoing pain, infection, damage to vessels or nerves, recurrent tear, and development of early arthritis.      PROCEDURE: Misha was met in the preoperative holding area where the right leg was marked.  Consent was reviewed.  He was then transferred to the operating theater.  After smooth induction of general anesthesia, he was  positioned supine on a regular table with a side post.  A time-out was performed, verifying the correct patient, surgery and location.  He received preoperative antibiotics.  The right lower extremity was prepped and draped in standard sterile fashion.      We then injected portal sites with 3cc of a mixture of 1% lidocaine with epi and 0.5% marcaine.  We created a standard anterolateral working portal followed by an anteromedial portal under spinal needle visualization.  Diagnostic arthroscopy was carried out with results as above.  We turned our attention to the medial compartment.  We used a shaver to remove the loose chondral body.  Then, with a combination of a shaver and punches, the degenerative tear of the posterior horn of the medial meniscus  was debrided back to a smooth and stable rim, confluent with the remainder of the meniscus.  The shaver was brought in the suprapatellar pouch to ensure that all debris had been removed.  Arthroscopic instruments were removed.  All excess fluid was evacuated.  The portals were closed with nylon.  The knee was injected with 10 mL of 1% lidocaine with epinephrine.  A sterile dressing was applied and the patient was transferred to PACU in stable condition.      POSTOPERATIVE PLAN:   1.  Activity as tolerated as limited by pain  2.  Discharge home today when meets same day discharge criteria.   3.  Return to clinic in 2 weeks for wound check.         GIANFRANCO MARIA MD

## 2024-09-17 NOTE — ANESTHESIA PROCEDURE NOTES
Adductor canal Procedure Note    Pre-Procedure   Staff -        CRNA: Marianne Montaño APRN CRNA       Other Anesthesia Staff: Enzo Chen       Performed By: CRNA and PDERO LUIS       Location: post-op       Procedure Start/Stop Times: 9/17/2024 9:00 AM and 9/17/2024 9:05 AM       Pre-Anesthestic Checklist: patient identified, IV checked, site marked, risks and benefits discussed, informed consent, monitors and equipment checked, pre-op evaluation, at physician/surgeon's request and post-op pain management  Timeout:       Correct Patient: Yes        Correct Procedure: Yes        Correct Site: Yes        Correct Position: Yes        Correct Laterality: Yes        Site Marked: Yes  Procedure Documentation  Procedure: Adductor canal       Diagnosis: POSTOPERATIVE PAIN       Laterality: right       Patient Position: supine       Patient Prep/Sterile Barriers: sterile gloves, mask, patient draped       Skin prep: Chloraprep       Needle Type: insulated       Needle Gauge: 21.        Needle Length (millimeters): 100        Ultrasound guided       1. Ultrasound was used to identify targeted nerve, plexus, vascular marker, or fascial plane and place a needle adjacent to it in real-time.       2. Ultrasound was used to visualize the spread of anesthetic in close proximity to the above referenced structure.       3. A permanent image is entered into the patient's record.       4. The visualized anatomic structures appeared normal.       5. There were no apparent abnormal pathologic findings.    Assessment/Narrative         The placement was negative for: blood aspirated, painful injection and site bleeding       Paresthesias: No.       Bolus given via needle..        Secured via.        Insertion/Infusion Method: Single Shot    Medication(s) Administered   Ropivacaine 0.5% PF (Infiltration) - Infiltration   20 mL - 9/17/2024 9:05:00 AM  Dexamethasone 10 mg/mL PF (Perineural) - Perineural   4 mg - 9/17/2024 9:05:00  "AM  Medication Administration Time: 9/17/2024 9:00 AM      FOR [facility] ONLY:   Pain Team Contact information: please page the Pain Team Via [employer]. Search \"Pain\". During daytime hours, please page the attending first. At night please page the resident first.      "

## 2024-10-24 ENCOUNTER — TELEPHONE (OUTPATIENT)
Dept: FAMILY MEDICINE | Facility: CLINIC | Age: 64
End: 2024-10-24
Payer: COMMERCIAL

## 2024-10-24 NOTE — TELEPHONE ENCOUNTER
Patient calling stating his pulse has been elevated. Patient states baseline pulse is 93. After dishes it was 118 and resting for 1 hour his pulse is 106. Patient states he has an upcoming appointment with Cardiology. Advised patient to call Cardiology and ask to speak to a nurse. Patient voiced understanding.    Luciano PETTIT RN  [facility]

## 2025-06-18 ENCOUNTER — TRANSCRIBE ORDERS (OUTPATIENT)
Dept: OTHER | Age: 65
End: 2025-06-18

## 2025-06-18 DIAGNOSIS — M25.561 RIGHT KNEE PAIN: Primary | ICD-10-CM

## (undated) DEVICE — PREP CHLORAPREP 26ML TINTED ORANGE  260815

## (undated) DEVICE — PACK ARTHROSCOPY KNEE

## (undated) DEVICE — GOWN IMPERVIOUS SPECIALTY XLG/XLONG 32474

## (undated) DEVICE — GLOVE BIOGEL PI MICRO INDICATOR UNDERGLOVE SZ 6.5 48965

## (undated) DEVICE — PAD FLOOR SURGISAFE

## (undated) DEVICE — GLOVE BIOGEL PI MICRO SZ 8.0 48580

## (undated) DEVICE — GLOVE BIOGEL PI MICRO SZ 6.5 48565

## (undated) DEVICE — TUBING ARTHROSCOPY PUMP ARTHREX AR-6410

## (undated) DEVICE — SUCTION MANIFOLD NEPTUNE 2 SYS 4 PORT 0702-020-000

## (undated) DEVICE — GLOVE BIOGEL PI MICRO INDICATOR UNDERGLOVE SZ 8.0 48980

## (undated) DEVICE — SOL NACL 0.9% IRRIG 3000ML BAG 07972-08

## (undated) DEVICE — SU ETHILON 3-0 PS-2 18" 1669H

## (undated) DEVICE — BUR ARTHREX COOLCUT SABRE 3.8MMX13CM AR-8380SR

## (undated) DEVICE — GOWN LG DISP 9515

## (undated) RX ORDER — PROPOFOL 10 MG/ML
INJECTION, EMULSION INTRAVENOUS
Status: DISPENSED
Start: 2024-09-17

## (undated) RX ORDER — PHENYLEPHRINE HYDROCHLORIDE 10 MG/ML
INJECTION INTRAVENOUS
Status: DISPENSED
Start: 2024-09-17

## (undated) RX ORDER — ONDANSETRON 2 MG/ML
INJECTION INTRAMUSCULAR; INTRAVENOUS
Status: DISPENSED
Start: 2024-09-17

## (undated) RX ORDER — GABAPENTIN 300 MG/1
CAPSULE ORAL
Status: DISPENSED
Start: 2024-09-17

## (undated) RX ORDER — LIDOCAINE HYDROCHLORIDE 10 MG/ML
INJECTION, SOLUTION EPIDURAL; INFILTRATION; INTRACAUDAL; PERINEURAL
Status: DISPENSED
Start: 2024-09-17

## (undated) RX ORDER — DEXAMETHASONE SODIUM PHOSPHATE 4 MG/ML
INJECTION, SOLUTION INTRA-ARTICULAR; INTRALESIONAL; INTRAMUSCULAR; INTRAVENOUS; SOFT TISSUE
Status: DISPENSED
Start: 2024-09-17

## (undated) RX ORDER — ACETAMINOPHEN 325 MG/1
TABLET ORAL
Status: DISPENSED
Start: 2024-09-17

## (undated) RX ORDER — CEFAZOLIN SODIUM/WATER 2 G/20 ML
SYRINGE (ML) INTRAVENOUS
Status: DISPENSED
Start: 2024-09-17

## (undated) RX ORDER — FENTANYL CITRATE 50 UG/ML
INJECTION, SOLUTION INTRAMUSCULAR; INTRAVENOUS
Status: DISPENSED
Start: 2024-09-17

## (undated) RX ORDER — LIDOCAINE HYDROCHLORIDE AND EPINEPHRINE 10; 10 MG/ML; UG/ML
INJECTION, SOLUTION INFILTRATION; PERINEURAL
Status: DISPENSED
Start: 2024-09-17